# Patient Record
Sex: FEMALE | Race: WHITE | NOT HISPANIC OR LATINO
[De-identification: names, ages, dates, MRNs, and addresses within clinical notes are randomized per-mention and may not be internally consistent; named-entity substitution may affect disease eponyms.]

---

## 2020-10-01 ENCOUNTER — APPOINTMENT (OUTPATIENT)
Dept: INTERNAL MEDICINE | Facility: CLINIC | Age: 70
End: 2020-10-01
Payer: MEDICARE

## 2020-10-01 ENCOUNTER — NON-APPOINTMENT (OUTPATIENT)
Age: 70
End: 2020-10-01

## 2020-10-01 VITALS
HEIGHT: 61 IN | DIASTOLIC BLOOD PRESSURE: 67 MMHG | BODY MASS INDEX: 25.86 KG/M2 | SYSTOLIC BLOOD PRESSURE: 136 MMHG | RESPIRATION RATE: 12 BRPM | OXYGEN SATURATION: 99 % | WEIGHT: 137 LBS | HEART RATE: 70 BPM

## 2020-10-01 VITALS — DIASTOLIC BLOOD PRESSURE: 60 MMHG | SYSTOLIC BLOOD PRESSURE: 120 MMHG

## 2020-10-01 DIAGNOSIS — Z78.9 OTHER SPECIFIED HEALTH STATUS: ICD-10-CM

## 2020-10-01 PROCEDURE — 99497 ADVNCD CARE PLAN 30 MIN: CPT | Mod: 33

## 2020-10-01 PROCEDURE — 36415 COLL VENOUS BLD VENIPUNCTURE: CPT

## 2020-10-01 PROCEDURE — 93000 ELECTROCARDIOGRAM COMPLETE: CPT | Mod: 59

## 2020-10-01 PROCEDURE — G0439: CPT

## 2020-10-01 PROCEDURE — G0444 DEPRESSION SCREEN ANNUAL: CPT | Mod: 59

## 2020-10-02 ENCOUNTER — LABORATORY RESULT (OUTPATIENT)
Age: 70
End: 2020-10-02

## 2020-10-05 LAB
ALBUMIN SERPL ELPH-MCNC: 4.5 G/DL
ALP BLD-CCNC: 42 U/L
ALT SERPL-CCNC: 18 U/L
ANION GAP SERPL CALC-SCNC: 12 MMOL/L
APPEARANCE: CLEAR
AST SERPL-CCNC: 22 U/L
BACTERIA: NEGATIVE
BASOPHILS # BLD AUTO: 0.05 K/UL
BASOPHILS NFR BLD AUTO: 1.1 %
BILIRUB SERPL-MCNC: 0.8 MG/DL
BILIRUBIN URINE: NEGATIVE
BLOOD URINE: NEGATIVE
BUN SERPL-MCNC: 14 MG/DL
CALCIUM SERPL-MCNC: 9.3 MG/DL
CHLORIDE SERPL-SCNC: 104 MMOL/L
CHOLEST SERPL-MCNC: 240 MG/DL
CHOLEST/HDLC SERPL: 3.5 RATIO
CO2 SERPL-SCNC: 25 MMOL/L
COLOR: NORMAL
CREAT SERPL-MCNC: 0.73 MG/DL
EOSINOPHIL # BLD AUTO: 0.13 K/UL
EOSINOPHIL NFR BLD AUTO: 2.8 %
ESTIMATED AVERAGE GLUCOSE: 82 MG/DL
GLUCOSE QUALITATIVE U: NEGATIVE
GLUCOSE SERPL-MCNC: 89 MG/DL
HBA1C MFR BLD HPLC: 4.5 %
HCT VFR BLD CALC: 39.3 %
HDLC SERPL-MCNC: 69 MG/DL
HGB BLD-MCNC: 12.8 G/DL
HYALINE CASTS: 0 /LPF
IMM GRANULOCYTES NFR BLD AUTO: 0.4 %
KETONES URINE: NEGATIVE
LDLC SERPL CALC-MCNC: 160 MG/DL
LEUKOCYTE ESTERASE URINE: NEGATIVE
LYMPHOCYTES # BLD AUTO: 1.33 K/UL
LYMPHOCYTES NFR BLD AUTO: 28.2 %
MAN DIFF?: NORMAL
MCHC RBC-ENTMCNC: 32.6 GM/DL
MCHC RBC-ENTMCNC: 32.8 PG
MCV RBC AUTO: 100.8 FL
MICROSCOPIC-UA: NORMAL
MONOCYTES # BLD AUTO: 0.43 K/UL
MONOCYTES NFR BLD AUTO: 9.1 %
NEUTROPHILS # BLD AUTO: 2.76 K/UL
NEUTROPHILS NFR BLD AUTO: 58.4 %
NITRITE URINE: NEGATIVE
PH URINE: 7
PLATELET # BLD AUTO: 206 K/UL
POTASSIUM SERPL-SCNC: 4.8 MMOL/L
PROT SERPL-MCNC: 6.7 G/DL
PROTEIN URINE: NEGATIVE
RBC # BLD: 3.9 M/UL
RBC # FLD: 11.2 %
RED BLOOD CELLS URINE: 0 /HPF
SODIUM SERPL-SCNC: 141 MMOL/L
SPECIFIC GRAVITY URINE: 1.01
SQUAMOUS EPITHELIAL CELLS: 1 /HPF
TRIGL SERPL-MCNC: 58 MG/DL
UROBILINOGEN URINE: NORMAL
WBC # FLD AUTO: 4.72 K/UL
WHITE BLOOD CELLS URINE: 0 /HPF

## 2020-10-10 ENCOUNTER — APPOINTMENT (OUTPATIENT)
Dept: INTERNAL MEDICINE | Facility: CLINIC | Age: 70
End: 2020-10-10
Payer: MEDICARE

## 2020-10-10 VITALS
WEIGHT: 137 LBS | RESPIRATION RATE: 12 BRPM | BODY MASS INDEX: 25.86 KG/M2 | SYSTOLIC BLOOD PRESSURE: 145 MMHG | HEART RATE: 56 BPM | DIASTOLIC BLOOD PRESSURE: 73 MMHG | OXYGEN SATURATION: 99 % | HEIGHT: 61 IN

## 2020-10-10 PROCEDURE — 90674 CCIIV4 VAC NO PRSV 0.5 ML IM: CPT

## 2020-10-10 PROCEDURE — 99212 OFFICE O/P EST SF 10 MIN: CPT | Mod: 25

## 2020-10-10 PROCEDURE — G0008: CPT

## 2020-10-10 NOTE — PHYSICAL EXAM
[No Acute Distress] : no acute distress [PERRL] : pupils equal round and reactive to light [EOMI] : extraocular movements intact [No Lymphadenopathy] : no lymphadenopathy [Thyroid Normal, No Nodules] : the thyroid was normal and there were no nodules present [No Carotid Bruits] : no carotid bruits [No Abdominal Bruit] : a ~M bruit was not heard ~T in the abdomen [No Edema] : there was no peripheral edema [Normal Appearance] : normal in appearance [No Masses] : no palpable masses [No Nipple Discharge] : no nipple discharge [Normal] : no posterior cervical lymphadenopathy and no anterior cervical lymphadenopathy [No CVA Tenderness] : no CVA  tenderness [No Focal Deficits] : no focal deficits [de-identified] : NO SUSPICIOUS SKIN LESIONS

## 2020-10-10 NOTE — HEALTH RISK ASSESSMENT
[Very Good] : ~his/her~ current health as very good [0] : 2) Feeling down, depressed, or hopeless: Not at all (0) [Patient reported mammogram was normal] : Patient reported mammogram was normal [Patient reported PAP Smear was normal] : Patient reported PAP Smear was normal [Patient reported bone density results were abnormal] : Patient reported bone density results were abnormal [Patient reported colonoscopy was normal] : Patient reported colonoscopy was normal [No falls in past year] : Patient reported no falls in the past year [[id number]] : 0 [MammogramDate] : 10/209 [PapSmearDate] : 2018 [PapSmearComments] : SCHEDULED  [BoneDensityComments] : SEVRAL YEAR AGO , OSTEOPOROSIS TREATED  WITH RECLAST   [ColonoscopyDate] : 2018 [AdvancecareDate] : 10/1/2020 [FreeTextEntry4] : DISCUSSED W/ PATIENT , FORMS GIVEN , QUESTIONS ANSWERED

## 2020-10-10 NOTE — REVIEW OF SYSTEMS
[Negative] : Heme/Lymph [Earache] : no earache [Nasal Discharge] : no nasal discharge [Dysuria] : no dysuria [Hematuria] : no hematuria [Headache] : no headache [Dizziness] : no dizziness [FreeTextEntry3] : GLASSES

## 2020-10-10 NOTE — HISTORY OF PRESENT ILLNESS
[FreeTextEntry1] : ROUTINE CLINIC FOLLOW UP  [de-identified] : PATIENT HERE FOR FLU VACCINE , FEELS  WELL , NO FEVER

## 2020-10-12 ENCOUNTER — MED ADMIN CHARGE (OUTPATIENT)
Age: 70
End: 2020-10-12

## 2021-06-09 ENCOUNTER — NON-APPOINTMENT (OUTPATIENT)
Age: 71
End: 2021-06-09

## 2021-06-09 ENCOUNTER — APPOINTMENT (OUTPATIENT)
Dept: INTERNAL MEDICINE | Facility: CLINIC | Age: 71
End: 2021-06-09
Payer: MEDICARE

## 2021-06-09 VITALS
TEMPERATURE: 100.5 F | DIASTOLIC BLOOD PRESSURE: 59 MMHG | WEIGHT: 139 LBS | OXYGEN SATURATION: 95 % | HEART RATE: 85 BPM | BODY MASS INDEX: 26.26 KG/M2 | SYSTOLIC BLOOD PRESSURE: 93 MMHG

## 2021-06-09 VITALS — SYSTOLIC BLOOD PRESSURE: 98 MMHG | DIASTOLIC BLOOD PRESSURE: 60 MMHG

## 2021-06-09 LAB
FLUAV SPEC QL CULT: NORMAL
FLUBV AG SPEC QL IA: NORMAL

## 2021-06-09 PROCEDURE — 99214 OFFICE O/P EST MOD 30 MIN: CPT | Mod: 25

## 2021-06-09 PROCEDURE — 87804 INFLUENZA ASSAY W/OPTIC: CPT | Mod: QW

## 2021-06-09 NOTE — HISTORY OF PRESENT ILLNESS
[FreeTextEntry1] : C/O FEVER , SLIGHT COUGH  [de-identified] : PATIENT C/O FEVER  ON MONDAY W/ CHILLS , WENT TO LOCAL  AND HAD NEG COVID SWAB , SHE HAS BEEN VACCINATED FOR COVID COMPLETING SERIES IN MARCH . FEVER HAS PERSISTED UNTIL LAST NIGHT AND TOOK TYLENOL W/ RELIEF , ONE DAY AGO SHE DEVELOPED NON PRODUCTIVE COUGH , DENIES ANY DYSPNEA , CHEST PAINS , DIARRHEA , RECENT RAVEL , NO FAMILY MEMBERS ARE ILL

## 2021-06-09 NOTE — PHYSICAL EXAM
[No Acute Distress] : no acute distress [Normal Sclera/Conjunctiva] : normal sclera/conjunctiva [PERRL] : pupils equal round and reactive to light [EOMI] : extraocular movements intact [Normal Outer Ear/Nose] : the outer ears and nose were normal in appearance [Normal Oropharynx] : the oropharynx was normal [No Lymphadenopathy] : no lymphadenopathy [No Accessory Muscle Use] : no accessory muscle use [Normal Percussion] : the chest was normal to percussion [No Edema] : there was no peripheral edema [Normal] : soft, non-tender, non-distended, no masses palpated, no HSM and normal bowel sounds [Normal Supraclavicular Nodes] : no supraclavicular lymphadenopathy [Normal Posterior Cervical Nodes] : no posterior cervical lymphadenopathy [Normal Anterior Cervical Nodes] : no anterior cervical lymphadenopathy [No CVA Tenderness] : no CVA  tenderness [No Focal Deficits] : no focal deficits [de-identified] : + RALES RIGHT BASE , NO DULLNESS

## 2021-06-09 NOTE — REVIEW OF SYSTEMS
[Fever] : fever [Chills] : chills [Cough] : cough [Negative] : Musculoskeletal [Recent Change In Weight] : ~T no recent weight change [Headache] : no headache [Dizziness] : no dizziness [Unsteady Walking] : no ataxia

## 2021-06-10 LAB
ALBUMIN SERPL ELPH-MCNC: 4 G/DL
ALP BLD-CCNC: 44 U/L
ALT SERPL-CCNC: 27 U/L
ANION GAP SERPL CALC-SCNC: 11 MMOL/L
AST SERPL-CCNC: 32 U/L
BASOPHILS # BLD AUTO: 0.02 K/UL
BASOPHILS NFR BLD AUTO: 0.2 %
BILIRUB SERPL-MCNC: 1 MG/DL
BUN SERPL-MCNC: 13 MG/DL
CALCIUM SERPL-MCNC: 8.4 MG/DL
CHLORIDE SERPL-SCNC: 98 MMOL/L
CO2 SERPL-SCNC: 24 MMOL/L
CREAT SERPL-MCNC: 0.79 MG/DL
EOSINOPHIL # BLD AUTO: 0.01 K/UL
EOSINOPHIL NFR BLD AUTO: 0.1 %
GLUCOSE SERPL-MCNC: 153 MG/DL
HCT VFR BLD CALC: 36.9 %
HGB BLD-MCNC: 11.9 G/DL
IMM GRANULOCYTES NFR BLD AUTO: 0.6 %
LYMPHOCYTES # BLD AUTO: 0.59 K/UL
LYMPHOCYTES NFR BLD AUTO: 6.9 %
MAN DIFF?: NORMAL
MCHC RBC-ENTMCNC: 32 PG
MCHC RBC-ENTMCNC: 32.2 GM/DL
MCV RBC AUTO: 99.2 FL
MONOCYTES # BLD AUTO: 0.48 K/UL
MONOCYTES NFR BLD AUTO: 5.6 %
NEUTROPHILS # BLD AUTO: 7.37 K/UL
NEUTROPHILS NFR BLD AUTO: 86.6 %
PLATELET # BLD AUTO: 162 K/UL
POTASSIUM SERPL-SCNC: 3.7 MMOL/L
PROT SERPL-MCNC: 6.3 G/DL
RBC # BLD: 3.72 M/UL
RBC # FLD: 11.2 %
SARS-COV-2 N GENE NPH QL NAA+PROBE: NOT DETECTED
SODIUM SERPL-SCNC: 133 MMOL/L
WBC # FLD AUTO: 8.52 K/UL

## 2021-06-11 ENCOUNTER — APPOINTMENT (OUTPATIENT)
Dept: INTERNAL MEDICINE | Facility: CLINIC | Age: 71
End: 2021-06-11
Payer: MEDICARE

## 2021-06-11 ENCOUNTER — INPATIENT (INPATIENT)
Facility: HOSPITAL | Age: 71
LOS: 1 days | Discharge: HOME | DRG: 195 | End: 2021-06-13
Attending: INTERNAL MEDICINE | Admitting: INTERNAL MEDICINE
Payer: MEDICARE

## 2021-06-11 VITALS
WEIGHT: 138.89 LBS | OXYGEN SATURATION: 97 % | HEART RATE: 74 BPM | SYSTOLIC BLOOD PRESSURE: 118 MMHG | DIASTOLIC BLOOD PRESSURE: 74 MMHG | RESPIRATION RATE: 16 BRPM | TEMPERATURE: 97 F

## 2021-06-11 VITALS
TEMPERATURE: 98.5 F | SYSTOLIC BLOOD PRESSURE: 108 MMHG | WEIGHT: 139 LBS | DIASTOLIC BLOOD PRESSURE: 69 MMHG | BODY MASS INDEX: 26.24 KG/M2 | RESPIRATION RATE: 12 BRPM | HEIGHT: 61 IN | OXYGEN SATURATION: 96 % | HEART RATE: 90 BPM

## 2021-06-11 DIAGNOSIS — Z29.9 ENCOUNTER FOR PROPHYLACTIC MEASURES, UNSPECIFIED: ICD-10-CM

## 2021-06-11 DIAGNOSIS — E87.6 HYPOKALEMIA: ICD-10-CM

## 2021-06-11 DIAGNOSIS — Z98.890 OTHER SPECIFIED POSTPROCEDURAL STATES: Chronic | ICD-10-CM

## 2021-06-11 DIAGNOSIS — E87.8 OTHER DISORDERS OF ELECTROLYTE AND FLUID BALANCE, NOT ELSEWHERE CLASSIFIED: ICD-10-CM

## 2021-06-11 DIAGNOSIS — J18.9 PNEUMONIA, UNSPECIFIED ORGANISM: ICD-10-CM

## 2021-06-11 LAB
ALBUMIN SERPL ELPH-MCNC: 3 G/DL — LOW (ref 3.3–5)
ALP SERPL-CCNC: 41 U/L — SIGNIFICANT CHANGE UP (ref 40–120)
ALT FLD-CCNC: 47 U/L — SIGNIFICANT CHANGE UP (ref 12–78)
ANION GAP SERPL CALC-SCNC: 5 MMOL/L — SIGNIFICANT CHANGE UP (ref 5–17)
APPEARANCE UR: CLEAR — SIGNIFICANT CHANGE UP
APTT BLD: 28.7 SEC — SIGNIFICANT CHANGE UP (ref 27.5–35.5)
AST SERPL-CCNC: 53 U/L — HIGH (ref 15–37)
BASOPHILS # BLD AUTO: 0.04 K/UL — SIGNIFICANT CHANGE UP (ref 0–0.2)
BASOPHILS NFR BLD AUTO: 0.6 % — SIGNIFICANT CHANGE UP (ref 0–2)
BILIRUB SERPL-MCNC: 0.7 MG/DL — SIGNIFICANT CHANGE UP (ref 0.2–1.2)
BILIRUB UR-MCNC: NEGATIVE — SIGNIFICANT CHANGE UP
BUN SERPL-MCNC: 14 MG/DL — SIGNIFICANT CHANGE UP (ref 7–23)
CALCIUM SERPL-MCNC: 7.9 MG/DL — LOW (ref 8.5–10.1)
CHLORIDE SERPL-SCNC: 105 MMOL/L — SIGNIFICANT CHANGE UP (ref 96–108)
CO2 SERPL-SCNC: 27 MMOL/L — SIGNIFICANT CHANGE UP (ref 22–31)
COLOR SPEC: SIGNIFICANT CHANGE UP
CREAT SERPL-MCNC: 0.73 MG/DL — SIGNIFICANT CHANGE UP (ref 0.5–1.3)
DIFF PNL FLD: ABNORMAL
EOSINOPHIL # BLD AUTO: 0.05 K/UL — SIGNIFICANT CHANGE UP (ref 0–0.5)
EOSINOPHIL NFR BLD AUTO: 0.8 % — SIGNIFICANT CHANGE UP (ref 0–6)
GLUCOSE SERPL-MCNC: 124 MG/DL — HIGH (ref 70–99)
GLUCOSE UR QL: NEGATIVE — SIGNIFICANT CHANGE UP
HCT VFR BLD CALC: 34.8 % — SIGNIFICANT CHANGE UP (ref 34.5–45)
HGB BLD-MCNC: 11.6 G/DL — SIGNIFICANT CHANGE UP (ref 11.5–15.5)
IMM GRANULOCYTES NFR BLD AUTO: 0.8 % — SIGNIFICANT CHANGE UP (ref 0–1.5)
INR BLD: 1.13 RATIO — SIGNIFICANT CHANGE UP (ref 0.88–1.16)
KETONES UR-MCNC: NEGATIVE — SIGNIFICANT CHANGE UP
LACTATE SERPL-SCNC: 1 MMOL/L — SIGNIFICANT CHANGE UP (ref 0.7–2)
LEUKOCYTE ESTERASE UR-ACNC: NEGATIVE — SIGNIFICANT CHANGE UP
LYMPHOCYTES # BLD AUTO: 1.06 K/UL — SIGNIFICANT CHANGE UP (ref 1–3.3)
LYMPHOCYTES # BLD AUTO: 17.1 % — SIGNIFICANT CHANGE UP (ref 13–44)
MCHC RBC-ENTMCNC: 31.1 PG — SIGNIFICANT CHANGE UP (ref 27–34)
MCHC RBC-ENTMCNC: 33.3 GM/DL — SIGNIFICANT CHANGE UP (ref 32–36)
MCV RBC AUTO: 93.3 FL — SIGNIFICANT CHANGE UP (ref 80–100)
MONOCYTES # BLD AUTO: 0.76 K/UL — SIGNIFICANT CHANGE UP (ref 0–0.9)
MONOCYTES NFR BLD AUTO: 12.3 % — SIGNIFICANT CHANGE UP (ref 2–14)
NEUTROPHILS # BLD AUTO: 4.24 K/UL — SIGNIFICANT CHANGE UP (ref 1.8–7.4)
NEUTROPHILS NFR BLD AUTO: 68.4 % — SIGNIFICANT CHANGE UP (ref 43–77)
NITRITE UR-MCNC: NEGATIVE — SIGNIFICANT CHANGE UP
NRBC # BLD: 0 /100 WBCS — SIGNIFICANT CHANGE UP (ref 0–0)
PH UR: 6 — SIGNIFICANT CHANGE UP (ref 5–8)
PLATELET # BLD AUTO: 182 K/UL — SIGNIFICANT CHANGE UP (ref 150–400)
POTASSIUM SERPL-MCNC: 3.4 MMOL/L — LOW (ref 3.5–5.3)
POTASSIUM SERPL-SCNC: 3.4 MMOL/L — LOW (ref 3.5–5.3)
PROT SERPL-MCNC: 6.5 G/DL — SIGNIFICANT CHANGE UP (ref 6–8.3)
PROT UR-MCNC: NEGATIVE — SIGNIFICANT CHANGE UP
PROTHROM AB SERPL-ACNC: 13.1 SEC — SIGNIFICANT CHANGE UP (ref 10.6–13.6)
RAPID RVP RESULT: SIGNIFICANT CHANGE UP
RBC # BLD: 3.73 M/UL — LOW (ref 3.8–5.2)
RBC # FLD: 11.5 % — SIGNIFICANT CHANGE UP (ref 10.3–14.5)
SARS-COV-2 RNA SPEC QL NAA+PROBE: SIGNIFICANT CHANGE UP
SODIUM SERPL-SCNC: 137 MMOL/L — SIGNIFICANT CHANGE UP (ref 135–145)
SP GR SPEC: 1 — LOW (ref 1.01–1.02)
UROBILINOGEN FLD QL: NEGATIVE — SIGNIFICANT CHANGE UP
WBC # BLD: 6.2 K/UL — SIGNIFICANT CHANGE UP (ref 3.8–10.5)
WBC # FLD AUTO: 6.2 K/UL — SIGNIFICANT CHANGE UP (ref 3.8–10.5)

## 2021-06-11 PROCEDURE — 71046 X-RAY EXAM CHEST 2 VIEWS: CPT | Mod: 26

## 2021-06-11 PROCEDURE — G1004: CPT

## 2021-06-11 PROCEDURE — 99285 EMERGENCY DEPT VISIT HI MDM: CPT | Mod: CS

## 2021-06-11 PROCEDURE — 99214 OFFICE O/P EST MOD 30 MIN: CPT

## 2021-06-11 PROCEDURE — 71250 CT THORAX DX C-: CPT | Mod: 26,MG

## 2021-06-11 PROCEDURE — 93010 ELECTROCARDIOGRAM REPORT: CPT

## 2021-06-11 RX ORDER — PIPERACILLIN AND TAZOBACTAM 4; .5 G/20ML; G/20ML
3.38 INJECTION, POWDER, LYOPHILIZED, FOR SOLUTION INTRAVENOUS EVERY 8 HOURS
Refills: 0 | Status: DISCONTINUED | OUTPATIENT
Start: 2021-06-11 | End: 2021-06-11

## 2021-06-11 RX ORDER — ACETAMINOPHEN 500 MG
650 TABLET ORAL EVERY 6 HOURS
Refills: 0 | Status: DISCONTINUED | OUTPATIENT
Start: 2021-06-11 | End: 2021-06-13

## 2021-06-11 RX ORDER — PIPERACILLIN AND TAZOBACTAM 4; .5 G/20ML; G/20ML
3.38 INJECTION, POWDER, LYOPHILIZED, FOR SOLUTION INTRAVENOUS ONCE
Refills: 0 | Status: COMPLETED | OUTPATIENT
Start: 2021-06-11 | End: 2021-06-11

## 2021-06-11 RX ORDER — POTASSIUM CHLORIDE 20 MEQ
40 PACKET (EA) ORAL ONCE
Refills: 0 | Status: COMPLETED | OUTPATIENT
Start: 2021-06-11 | End: 2021-06-11

## 2021-06-11 RX ORDER — ENOXAPARIN SODIUM 100 MG/ML
40 INJECTION SUBCUTANEOUS DAILY
Refills: 0 | Status: DISCONTINUED | OUTPATIENT
Start: 2021-06-11 | End: 2021-06-13

## 2021-06-11 RX ORDER — SODIUM CHLORIDE 9 MG/ML
1000 INJECTION INTRAMUSCULAR; INTRAVENOUS; SUBCUTANEOUS
Refills: 0 | Status: DISCONTINUED | OUTPATIENT
Start: 2021-06-11 | End: 2021-06-13

## 2021-06-11 RX ORDER — SODIUM CHLORIDE 9 MG/ML
1950 INJECTION INTRAMUSCULAR; INTRAVENOUS; SUBCUTANEOUS ONCE
Refills: 0 | Status: COMPLETED | OUTPATIENT
Start: 2021-06-11 | End: 2021-06-11

## 2021-06-11 RX ORDER — PIPERACILLIN AND TAZOBACTAM 4; .5 G/20ML; G/20ML
3.38 INJECTION, POWDER, LYOPHILIZED, FOR SOLUTION INTRAVENOUS EVERY 8 HOURS
Refills: 0 | Status: DISCONTINUED | OUTPATIENT
Start: 2021-06-11 | End: 2021-06-12

## 2021-06-11 RX ORDER — AZITHROMYCIN 500 MG/1
500 TABLET, FILM COATED ORAL DAILY
Refills: 0 | Status: DISCONTINUED | OUTPATIENT
Start: 2021-06-12 | End: 2021-06-13

## 2021-06-11 RX ORDER — AZITHROMYCIN 500 MG/1
500 TABLET, FILM COATED ORAL EVERY 24 HOURS
Refills: 0 | Status: DISCONTINUED | OUTPATIENT
Start: 2021-06-12 | End: 2021-06-11

## 2021-06-11 RX ORDER — AZITHROMYCIN 500 MG/1
500 TABLET, FILM COATED ORAL ONCE
Refills: 0 | Status: COMPLETED | OUTPATIENT
Start: 2021-06-11 | End: 2021-06-11

## 2021-06-11 RX ORDER — ALBUTEROL 90 UG/1
2 AEROSOL, METERED ORAL THREE TIMES A DAY
Refills: 0 | Status: DISCONTINUED | OUTPATIENT
Start: 2021-06-11 | End: 2021-06-13

## 2021-06-11 RX ORDER — SACCHAROMYCES BOULARDII 250 MG
250 POWDER IN PACKET (EA) ORAL
Refills: 0 | Status: DISCONTINUED | OUTPATIENT
Start: 2021-06-11 | End: 2021-06-13

## 2021-06-11 RX ADMIN — Medication 40 MILLIEQUIVALENT(S): at 15:26

## 2021-06-11 RX ADMIN — Medication 250 MILLIGRAM(S): at 21:48

## 2021-06-11 RX ADMIN — SODIUM CHLORIDE 1950 MILLILITER(S): 9 INJECTION INTRAMUSCULAR; INTRAVENOUS; SUBCUTANEOUS at 12:38

## 2021-06-11 RX ADMIN — AZITHROMYCIN 255 MILLIGRAM(S): 500 TABLET, FILM COATED ORAL at 14:44

## 2021-06-11 RX ADMIN — PIPERACILLIN AND TAZOBACTAM 200 GRAM(S): 4; .5 INJECTION, POWDER, LYOPHILIZED, FOR SOLUTION INTRAVENOUS at 14:00

## 2021-06-11 RX ADMIN — AZITHROMYCIN 500 MILLIGRAM(S): 500 TABLET, FILM COATED ORAL at 15:45

## 2021-06-11 RX ADMIN — SODIUM CHLORIDE 1950 MILLILITER(S): 9 INJECTION INTRAMUSCULAR; INTRAVENOUS; SUBCUTANEOUS at 13:00

## 2021-06-11 RX ADMIN — Medication 650 MILLIGRAM(S): at 20:26

## 2021-06-11 RX ADMIN — PIPERACILLIN AND TAZOBACTAM 25 GRAM(S): 4; .5 INJECTION, POWDER, LYOPHILIZED, FOR SOLUTION INTRAVENOUS at 21:48

## 2021-06-11 RX ADMIN — ALBUTEROL 2 PUFF(S): 90 AEROSOL, METERED ORAL at 19:26

## 2021-06-11 RX ADMIN — SODIUM CHLORIDE 50 MILLILITER(S): 9 INJECTION INTRAMUSCULAR; INTRAVENOUS; SUBCUTANEOUS at 19:25

## 2021-06-11 RX ADMIN — PIPERACILLIN AND TAZOBACTAM 3.38 GRAM(S): 4; .5 INJECTION, POWDER, LYOPHILIZED, FOR SOLUTION INTRAVENOUS at 14:30

## 2021-06-11 RX ADMIN — Medication 650 MILLIGRAM(S): at 19:26

## 2021-06-11 NOTE — ED PROVIDER NOTE - PROGRESS NOTE DETAILS
Patient specifically requesting admission to Dr. JOLANTA Marcial as per pmd request. Will call Dr. Marcial and see if she accepts. Patient has never been admitted to  before. Case discussed with Dr. JOLANTA Marcial, accepts admission, aware patient is Dr. Duron's patient. Requests CT chest and admit.

## 2021-06-11 NOTE — ED PROVIDER NOTE - OBJECTIVE STATEMENT
72 yo female with no significant pmh sent to ED by her pmd for pneumonia. Patient started feeling sick last weekend, then Monday started having a fever. Patient went to  and was told it watch her symptoms, most likely viral. Patient then went to pmd on Wednesday, diagnosed with pneumonia and started on zpak and cefuroxime. Patient still having fever, last temp this morning 7 am, 103, took 1 g of tylenol. Patient had follow up with pmd this morning who recommended coming to ED for further evaluation. Patient had outpatient labs and covid swab 2 days ago which were unremarkable. PAtient c/o cough, generalized weakness, diarrhea and decreased appetite however patient is tolerating PO. Denies fever, chills, chest pain, sob, abd pain, N/V, urinary sxs, flank pain.     pmd: Dr. Willis

## 2021-06-11 NOTE — CONSULT NOTE ADULT - SUBJECTIVE AND OBJECTIVE BOX
[facility] DIVISION of INFECTIOUS DISEASE  Reji Szymanski MD PhD, Cyndie Patel MD, Farheen Marcial MD, Nathaniel Tesfaye MD  and providing coverage with Pratibha Enriquez MD and Audelia Peters MD  Providing Infectious Disease Consultations at [facility], [facility], [facility]'s    Office# [phone] to schedule follow up appointments  Answering Service for urgent calls or New Consults [phone]  Cell# to text for urgent issues Reji Szymanski [phone]     HPI:  70yo F, with no significant PMH, presenting from home with complaint of cough and fever. Patient felt ill on Saturday, stating she had a headache. She awoke feeling well again on , however by Monday, she had developed a fever of 101 deg. She initially presented to an urgent care center that day and was told her symptoms were likely viral in nature. Symptoms persisted, and she visited her PCP Dr. Duron on Wednesday, at which time patient was diagnosed with pneumonia by CXR and was prescribed azithromycin and Ceftin. Fever has continued with Tmax 103 deg this AM, which has responded to Tylenol, last taken at 7:30 AM. Currently, patient endorses nonproductive cough. She notes she had liquidy diarrhea for the past few days but has not had loose BMs today at all. She also states her appetite has decreased over the last few days but at time of my encounter, she feels hungry. Otherwise denies vision/hearing changes, chest pain, palpitations, dyspnea, abd pain, N/V, urinary symptoms, skin changes, or new myalgia/arthralgia.    In the ED  VS: T 97 HR 74 /74 RR 16 SpO2 97% on room air  Significant labs include K 3.4, Ca 7.9 (corrected 8.7), alb 3.0, AST 53.  CXR: infiltrate of RLL superior segment  CT chest: RLL PNA  EKG: NSR with no concerning ST segment/Tw abnormalities    Patient received 1950cc NS, 1x Zosyn, and 1x azithromycin. (2021 14:46)      PAST MEDICAL & SURGICAL HISTORY:  No pertinent past medical history    S/P excision of lipoma        Antimicrobials  azithromycin  IVPB 500 milliGRAM(s) IV Intermittent every 24 hours  piperacillin/tazobactam IVPB.. 3.375 Gram(s) IV Intermittent every 8 hours      Immunological      Other  acetaminophen   Tablet .. 650 milliGRAM(s) Oral every 6 hours PRN  ALBUTerol    90 MICROgram(s) HFA Inhaler 2 Puff(s) Inhalation three times a day  benzonatate 200 milliGRAM(s) Oral three times a day PRN  enoxaparin Injectable 40 milliGRAM(s) SubCutaneous daily  guaiFENesin Oral Liquid (Sugar-Free) 200 milliGRAM(s) Oral every 6 hours PRN  saccharomyces boulardii 250 milliGRAM(s) Oral two times a day  sodium chloride 0.9%. 1000 milliLiter(s) IV Continuous <Continuous>      Allergies    No Known Allergies    Intolerances        SOCIAL HISTORY:  Marital Status:  (   )    (   ) Single    (   )    ( x )   Occupation:   Lives with: ( x ) alone  (  ) children   (  ) spouse   (  ) parents  (  ) other    Substance Use (street drugs): ( x ) never used  (  ) other:  Tobacco Usage:  (  x ) never smoked   (   ) former smoker   (   ) current smoker  (     ) pack year  (        ) last cigarette date  Alcohol Usage: never    Immunization Hx:     (  ) pneumonia shot               (     ) date  (  ) COVID, Moderna /                           ( 3/2021 ) date (2021 14:46)      FAMILY HISTORY:  No pertinent family history in first degree relatives        ROS:    EYES:  Negative  blurry vision or double vision  GASTROINTESTINAL:  Negative for nausea, vomiting, diarrhea  -otherwise negative except for subjective    Vital Signs Last 24 Hrs  T(C): 37.4 (2021 15:30), Max: 37.4 (2021 15:30)  T(F): 99.3 (2021 15:30), Max: 99.3 (2021 15:30)  HR: 72 (2021 16:35) (62 - 74)  BP: 141/76 (2021 16:35) (118/74 - 146/69)  BP(mean): --  RR: 18 (2021 16:35) (16 - 18)  SpO2: 97% (2021 16:35) (97% - 97%)    PE:  WDWN in no distress  HEENT:  NC, PERRL, sclerae anicteric, conjunctivae clear, EOMI.  Sinuses nontender, no nasal exudate.  No buccal or pharyngeal lesions, erythema or exudate  Neck:  Supple, no adenopathy  Lungs:  No accessory muscle use, noted asymmetry in bases  Cor:  RRR, S1, S2, no murmur appreciated  Abd:  Symmetric, normoactive BS.  Soft, nontender, no masses, guarding or rebound.  Liver and spleen not enlarged  Extrem:  No cyanosis or edema  Skin:  No rashes.  Neuro: grossly intact  Musc: moving all limbs freely, no focal deficits        LABS:                        11.6   6.20  )-----------( 182      ( 2021 12:41 )             34.8       WBC Count: 6.20 K/uL (21 @ 12:41)          137  |  105  |  14  ----------------------------<  124<H>  3.4<L>   |  27  |  0.73    Ca    7.9<L>      2021 12:41    TPro  6.5  /  Alb  3.0<L>  /  TBili  0.7  /  DBili  x   /  AST  53<H>  /  ALT  47  /  AlkPhos  41        Creatinine, Serum: 0.73 mg/dL (21 @ 12:41)      Urinalysis Basic - ( 2021 16:58 )    Color: Pale Yellow / Appearance: Clear / S.005 / pH: x  Gluc: x / Ketone: Negative  / Bili: Negative / Urobili: Negative   Blood: x / Protein: Negative / Nitrite: Negative   Leuk Esterase: Negative / RBC: 0-2 /HPF / WBC Negative   Sq Epi: x / Non Sq Epi: Occasional / Bacteria: Occasional      MICROBIOLOGY:      RADIOLOGY & ADDITIONAL STUDIES:    --< from: CT Chest No Cont (21 @ 14:36) >    EXAM:  CT CHEST                            PROCEDURE DATE:  2021          INTERPRETATION:  CLINICAL INFORMATION: 71 years  Female with Pneumonia, unresolved.    COMPARISON: None.    CONTRAST/COMPLICATIONS:  IV Contrast: NONE  0 cc administered   0 cc discarded  Oral Contrast: NONE  Complications: None reported at time of study completion    PROCEDURE:  CT of the Chest was performed.  Sagittal and coronal reformats were performed.    FINDINGS:    LUNGS AND AIRWAYS: Patent central airways.Right lower lobe airspace consolidation. The remaining lung fields are clear.  PLEURA: No pleural effusion.  MEDIASTINUM AND CUATE: No lymphadenopathy.  VESSELS: Within normal limits.  HEART: Heart size is normal. No pericardial effusion.  CHEST WALL AND LOWER NECK: Within normal limits.  VISUALIZED UPPER ABDOMEN: 2.9 cm right upper pole renal cyst.  BONES: Within normal limits.    IMPRESSION:  Right lower lobe pneumonia

## 2021-06-11 NOTE — ED PROVIDER NOTE - BREATH SOUNDS
+ rales left base. no respiratory distress, talking in full sentences. no wheezing. + rales right base. no respiratory distress, talking in full sentences. no wheezing.

## 2021-06-11 NOTE — H&P ADULT - PROBLEM SELECTOR PLAN 3
- Pharm VTE ppx with daily Lovenox    IMPROVE VTE Individual Risk Assessment          RISK                                                          Points  [  ] Previous VTE                                                3  [  ] Thrombophilia                                             2  [  ] Lower limb paralysis                                   2        (unable to hold up >15 seconds)    [  ] Current Cancer                                             2         (within 6 months)  [  ] Immobilization > 24 hrs                              1  [  ] ICU/CCU stay > 24 hours                             1  [ x ] Age > 60                                                         1    IMPROVE VTE Score: 1

## 2021-06-11 NOTE — H&P ADULT - PROBLEM SELECTOR PLAN 1
- Patient with fever and cough x5d, prescribed azithromycin and Ceftin however persistently symptomatic  - Admit to F  - CXR reveals infiltrate in the superior segment of the right lower lobe with smaller infiltrate inferiorly  - CT chest noncon ordered, f/u results  - S/p 1950cc NS, azithromycin, and Zosyn in the ED, continue with Zosyn and azithromycin for atypical coverage  - Patient not currently meeting sepsis critieria  - Currently afebrile as patient took Tylenol at home, continue with Tylenol 650mg PO q6h PRN for fever  - Blood and urine cultures obtained in the ED, f/u results  - F/u MRSA PCR, legionella urine Ag, and strep urine Ag  - Leukocytosis absent at this time, monitor daily CBC - Patient with fever and cough x5d, prescribed azithromycin and Ceftin however persistently symptomatic  - Admit to F  - CXR reveals infiltrate in the superior segment of the right lower lobe with smaller infiltrate inferiorly  - CT chest noncon shows RLL PNA  - S/p 1950cc NS, azithromycin, and Zosyn in the ED, continue with Zosyn and azithromycin for atypical coverage  - Patient not currently meeting sepsis criteria but will continue with gentle maintenance IVF as patient with diminished appetitie  - Currently afebrile as patient took Tylenol at home, continue with Tylenol 650mg PO q6h PRN for fever  - Albuterol HFA TID and guaifenesin PRN  - Blood and urine cultures obtained in the ED, f/u results  - F/u MRSA PCR, legionella urine Ag, and strep urine Ag  - Leukocytosis absent at this time, monitor daily CBC - Patient with fever and cough x5d, prescribed azithromycin and Ceftin however persistently symptomatic with Fever & Cough   - Admit to F  - CXR reveals infiltrate in the superior segment of the right lower lobe with smaller infiltrate inferiorly  - CT chest noncon shows RLL PNA  - S/p 1950cc NS, azithromycin, and Zosyn in the ED, continue with Zosyn and azithromycin for atypical coverage  - Patient not currently meeting sepsis criteria but will continue with gentle maintenance IVF as patient with diminished appetitie  - Currently afebrile as patient took Tylenol at home, continue with Tylenol 650mg PO q6h PRN for fever  - Albuterol HFA TID and guaifenesin PRN  - Blood and urine cultures obtained in the ED, f/u results  - F/u MRSA PCR, legionella urine Ag, and strep urine Ag  - Leukocytosis absent at this time, monitor daily CBC  -ID Dr Szymanski

## 2021-06-11 NOTE — H&P ADULT - NSHPSOCIALHISTORY_GEN_ALL_CORE
Social History:    Marital Status:  (   )    (   ) Single    (   )    (  )   Occupation:   Lives with: (  ) alone  (  ) children   (  ) spouse   (  ) parents  (  ) other    Substance Use (street drugs): (  ) never used  (  ) other:  Tobacco Usage:  (   ) never smoked   (   ) former smoker   (   ) current smoker  (     ) pack year  (        ) last cigarette date  Alcohol Usage:  Sexual History:     Health Management     For female:   Last Mammo: (     ) No  (    ) Yes  (    ) Normal  (    ) Date   Last Pap: (     ) No  (    ) Yes  (    ) Normal   (    ) Date     For male:  Last prostate exam: (     ) No  (    ) Yes  (    ) Date  (    ) Normal    Immunization Hx:   (  ) flu shot                               (     ) date   (  ) pneumonia shot               (     ) date  (  ) tetanus                               (     ) date     (     ) Advanced Directives: (     ) None    (      ) DNR    (     ) DNI    (     ) Health Care Proxy: Social History:    Marital Status:  (   )    (   ) Single    (   )    ( x )   Occupation:   Lives with: ( x ) alone  (  ) children   (  ) spouse   (  ) parents  (  ) other    Substance Use (street drugs): ( x ) never used  (  ) other:  Tobacco Usage:  (  x ) never smoked   (   ) former smoker   (   ) current smoker  (     ) pack year  (        ) last cigarette date  Alcohol Usage: never    Immunization Hx:     (  ) pneumonia shot               (     ) date  (  ) COVID, Moderna 2/2                           ( 3/2021 ) date

## 2021-06-11 NOTE — H&P ADULT - ATTENDING COMMENTS
72yo F, with no significant PMH, presenting from home with complaint of cough and fever, admitted for CAP that has failed outpatient abx.  Pt seen, examined, case & care plan d/w pt, residents at detail.  AM labs   PO diet   Ambulation.  ID Dr Szymanski   D/W Son at detail. 70yo F, with no significant PMH, presenting from home with complaint of cough and fever, admitted for RT LL CAP that has failed outpatient abx.  Pt seen, examined, case & care plan d/w pt, residents at detail.  AM labs   PO diet   Ambulation.  ID Dr Szymanski   D/W Son at detail.

## 2021-06-11 NOTE — ED PROVIDER NOTE - ATTENDING CONTRIBUTION TO CARE
pt is a 70 yo f who is prev healthy remote hx of pneumonia 8 years ago otherwise no illness or injury was feeling unwell earlier this week saw pmd with cough and fever found on xray to have pneumonia started on cefuroxime and Zithromax but today on re-evaluation found to have worsening exam and fevers  pt had neg covid test 2 days ago  on eval  plesant female nad no dyspnea  heent nc at [facility] perrla eomi anicteric no g f r op clear  neck supple  cor rrr  chest r sided crackles on mid to lower lung fields on exhalation  abd soft nt nt no hsm  ext normal  skin neuro normal  plan pt has pneumonia worsening despite abx sepsis workup ro viral pn with covid viral panel  xray iv abx and re evaluation

## 2021-06-11 NOTE — ED PROVIDER NOTE - CLINICAL SUMMARY MEDICAL DECISION MAKING FREE TEXT BOX
70 yo female with no significant pmh sent to ED by her pmd for pneumonia. Patient started feeling sick last weekend, then Monday started having a fever. Patient went to  and was told it watch her symptoms, most likely viral. Patient then went to pmd on Wednesday, diagnosed with pneumonia and started on zpak and cefuroxime. Patient still having fever, last temp this morning 7 am, 103, took 1 g of tylenol. Patient had follow up with pmd this morning who recommended coming to ED for further evaluation. Patient had outpatient labs and covid swab 2 days ago which were unremarkable. PAtient c/o cough, generalized weakness, diarrhea and decreased appetite however patient is tolerating PO. Denies fever, chills, chest pain, sob, abd pain, N/V, urinary sxs, flank pain. PE: as above. A/P: ekg, sepsis workup, cxr, fluids, reassess. 72 yo female with no significant pmh sent to ED by her pmd for pneumonia. Patient started feeling sick last weekend, then Monday started having a fever. Patient went to  and was told it watch her symptoms, most likely viral. Patient then went to pmd on Wednesday, diagnosed with pneumonia and started on zpak and cefuroxime. Patient still having fever, last temp this morning 7 am, 103, took 1 g of tylenol. Patient had follow up with pmd this morning who recommended coming to ED for further evaluation. Patient had outpatient labs and covid swab 2 days ago which were unremarkable. PAtient c/o cough, generalized weakness, diarrhea and decreased appetite however patient is tolerating PO. Denies fever, chills, chest pain, sob, abd pain, N/V, urinary sxs, flank pain. PE: as above. A/P: ekg, sepsis workup, cxr, fluids, reassess. + right sided pneumonia. Will admit.

## 2021-06-11 NOTE — CONSULT NOTE ADULT - ASSESSMENT
72yo F, with no significant PMH, presenting from home with complaint of cough, fever, and noted to have evidence of RLL airspace disease on exam and imaging    RECOMMENDATIONS    Story is compelling for Community Acquired PNA, RVP is negative and will send off additional testing to help guide therapy including MRSA nares PCR, urine for legionella and sputum, recommend standard CAP Rx with   Azithromycin/Ceftriaxone for now with recs to follow.    Thank you for consulting us and involving us in the management of this most interesting and challenging case.  We will follow along in the care of this patient. Please call us at [phone] or text me directly on my cell# at [phone] with any concerns.

## 2021-06-11 NOTE — H&P ADULT - RS GEN PE MLT RESP DETAILS PC
airway patent/good air movement/respirations non-labored/no rales/no wheezes airway patent/breath sounds equal/good air movement/respirations non-labored/no chest wall tenderness/no rales/no wheezes/diminished breath sounds, R/rhonchi

## 2021-06-11 NOTE — H&P ADULT - HISTORY OF PRESENT ILLNESS
72yo F, with no significant PMH, presenting from home with complaint of cough and fever. Patient felt ill last weekend and developed a fever on Monday. She initially presented to an urgent care center and was told her symptoms were likely viral in nature. Symptoms persisted, and she visited her PCP Dr. Duron, at which time patient was diagnosed with pneumonia and was prescribed azithromycin and Ceftin. Fever has continued with Tmax 103 deg this AM, which has responded to Tylenol, last taken at ___.     In the ED  VS: T 97 HR 74 /74 RR 16 SpO2 97% on room air  Significant labs include K 3.4, Ca 7.9 (corrected 8.7), alb 3.0, AST 53.  CXR: infiltrate of RLL superior segment  CT chest:  EKG:    Patient received 1950cc NS, 1x Zosyn, and 1x azithromycin. 70yo F, with no significant PMH, presenting from home with complaint of cough and fever. Patient felt ill on Saturday, stating she had a headache. She awoke feeling well again on Sunday, however by Monday, she had developed a fever of 101 deg. She initially presented to an urgent care center that day and was told her symptoms were likely viral in nature. Symptoms persisted, and she visited her PCP Dr. Duron on Wednesday, at which time patient was diagnosed with pneumonia by CXR and was prescribed azithromycin and Ceftin. Fever has continued with Tmax 103 deg this AM, which has responded to Tylenol, last taken at 7:30 AM. Currently, patient endorses nonproductive cough. She notes she had liquidy diarrhea for the past few days but has not had loose BMs today at all. She also states her appetite has decreased over the last few days but at time of my encounter, she feels hungry. Otherwise denies vision/hearing changes, chest pain, palpitations, dyspnea, abd pain, N/V, urinary symptoms, skin changes, or new myalgia/arthralgia.    In the ED  VS: T 97 HR 74 /74 RR 16 SpO2 97% on room air  Significant labs include K 3.4, Ca 7.9 (corrected 8.7), alb 3.0, AST 53.  CXR: infiltrate of RLL superior segment  CT chest: RLL PNA  EKG: NSR with no concerning ST segment/Tw abnormalities    Patient received 1950cc NS, 1x Zosyn, and 1x azithromycin.

## 2021-06-11 NOTE — ED ADULT NURSE NOTE - OBJECTIVE STATEMENT
received pt in bed #6b Pt A&O c/o not feeling well cough, weakness & dizziness x 1 week Seen by PMD on Wednesday had CXR dx w/ PN & placed on zpack & another antibx Pt continues to have fever took gram tylenol @ 0700 Seen by PMD today & was told her lungs sound worse & to go to ED. Pt's main complaints is weakness received pt in bed #6b Pt A&O c/o not feeling well cough, weakness & dizziness x 1 week Seen by PMD on Wednesday had CXR dx w/ PN & placed on zpack & another antibx Pt continues to have fever took gram tylenol @ 0700 Seen by PMD today & was told her lungs sound worse & to go to ED. Pt's main complaints is weakness. CM placed w/ SR o2 sat 97% on RA

## 2021-06-11 NOTE — H&P ADULT - ASSESSMENT
72yo F, with no significant PMH, presenting from home with complaint of cough and fever, admitted for CAP that has failed outpatient abx.

## 2021-06-12 ENCOUNTER — TRANSCRIPTION ENCOUNTER (OUTPATIENT)
Age: 71
End: 2021-06-12

## 2021-06-12 DIAGNOSIS — D64.9 ANEMIA, UNSPECIFIED: ICD-10-CM

## 2021-06-12 LAB
ALBUMIN SERPL ELPH-MCNC: 2.8 G/DL — LOW (ref 3.3–5)
ALP SERPL-CCNC: 37 U/L — LOW (ref 40–120)
ALT FLD-CCNC: 42 U/L — SIGNIFICANT CHANGE UP (ref 12–78)
ANION GAP SERPL CALC-SCNC: 7 MMOL/L — SIGNIFICANT CHANGE UP (ref 5–17)
AST SERPL-CCNC: 49 U/L — HIGH (ref 15–37)
BILIRUB SERPL-MCNC: 0.7 MG/DL — SIGNIFICANT CHANGE UP (ref 0.2–1.2)
BUN SERPL-MCNC: 7 MG/DL — SIGNIFICANT CHANGE UP (ref 7–23)
CALCIUM SERPL-MCNC: 7.5 MG/DL — LOW (ref 8.5–10.1)
CHLORIDE SERPL-SCNC: 109 MMOL/L — HIGH (ref 96–108)
CO2 SERPL-SCNC: 23 MMOL/L — SIGNIFICANT CHANGE UP (ref 22–31)
COVID-19 SPIKE DOMAIN AB INTERP: POSITIVE
COVID-19 SPIKE DOMAIN ANTIBODY RESULT: >250 U/ML — HIGH
CREAT SERPL-MCNC: 0.51 MG/DL — SIGNIFICANT CHANGE UP (ref 0.5–1.3)
GLUCOSE SERPL-MCNC: 111 MG/DL — HIGH (ref 70–99)
HCT VFR BLD CALC: 30.7 % — LOW (ref 34.5–45)
HCV AB S/CO SERPL IA: 0.08 S/CO — SIGNIFICANT CHANGE UP (ref 0–0.99)
HCV AB SERPL-IMP: SIGNIFICANT CHANGE UP
HGB BLD-MCNC: 10.5 G/DL — LOW (ref 11.5–15.5)
LEGIONELLA AG UR QL: NEGATIVE — SIGNIFICANT CHANGE UP
MCHC RBC-ENTMCNC: 31.6 PG — SIGNIFICANT CHANGE UP (ref 27–34)
MCHC RBC-ENTMCNC: 34.2 GM/DL — SIGNIFICANT CHANGE UP (ref 32–36)
MCV RBC AUTO: 92.5 FL — SIGNIFICANT CHANGE UP (ref 80–100)
NRBC # BLD: 0 /100 WBCS — SIGNIFICANT CHANGE UP (ref 0–0)
PLATELET # BLD AUTO: 180 K/UL — SIGNIFICANT CHANGE UP (ref 150–400)
POTASSIUM SERPL-MCNC: 3.9 MMOL/L — SIGNIFICANT CHANGE UP (ref 3.5–5.3)
POTASSIUM SERPL-SCNC: 3.9 MMOL/L — SIGNIFICANT CHANGE UP (ref 3.5–5.3)
PROT SERPL-MCNC: 5.8 G/DL — LOW (ref 6–8.3)
RBC # BLD: 3.32 M/UL — LOW (ref 3.8–5.2)
RBC # FLD: 11.7 % — SIGNIFICANT CHANGE UP (ref 10.3–14.5)
SARS-COV-2 IGG+IGM SERPL QL IA: >250 U/ML — HIGH
SARS-COV-2 IGG+IGM SERPL QL IA: POSITIVE
SODIUM SERPL-SCNC: 139 MMOL/L — SIGNIFICANT CHANGE UP (ref 135–145)
WBC # BLD: 4.91 K/UL — SIGNIFICANT CHANGE UP (ref 3.8–10.5)
WBC # FLD AUTO: 4.91 K/UL — SIGNIFICANT CHANGE UP (ref 3.8–10.5)

## 2021-06-12 RX ORDER — CEFTRIAXONE 500 MG/1
1000 INJECTION, POWDER, FOR SOLUTION INTRAMUSCULAR; INTRAVENOUS EVERY 24 HOURS
Refills: 0 | Status: DISCONTINUED | OUTPATIENT
Start: 2021-06-12 | End: 2021-06-13

## 2021-06-12 RX ADMIN — Medication 250 MILLIGRAM(S): at 05:27

## 2021-06-12 RX ADMIN — AZITHROMYCIN 500 MILLIGRAM(S): 500 TABLET, FILM COATED ORAL at 11:51

## 2021-06-12 RX ADMIN — ALBUTEROL 2 PUFF(S): 90 AEROSOL, METERED ORAL at 19:04

## 2021-06-12 RX ADMIN — PIPERACILLIN AND TAZOBACTAM 25 GRAM(S): 4; .5 INJECTION, POWDER, LYOPHILIZED, FOR SOLUTION INTRAVENOUS at 07:10

## 2021-06-12 RX ADMIN — Medication 250 MILLIGRAM(S): at 18:09

## 2021-06-12 RX ADMIN — ALBUTEROL 2 PUFF(S): 90 AEROSOL, METERED ORAL at 11:51

## 2021-06-12 RX ADMIN — ALBUTEROL 2 PUFF(S): 90 AEROSOL, METERED ORAL at 07:11

## 2021-06-12 RX ADMIN — Medication 200 MILLIGRAM(S): at 18:14

## 2021-06-12 RX ADMIN — Medication 200 MILLIGRAM(S): at 04:53

## 2021-06-12 RX ADMIN — CEFTRIAXONE 100 MILLIGRAM(S): 500 INJECTION, POWDER, FOR SOLUTION INTRAMUSCULAR; INTRAVENOUS at 14:39

## 2021-06-12 RX ADMIN — Medication 200 MILLIGRAM(S): at 15:48

## 2021-06-12 RX ADMIN — ENOXAPARIN SODIUM 40 MILLIGRAM(S): 100 INJECTION SUBCUTANEOUS at 11:51

## 2021-06-12 NOTE — PROGRESS NOTE ADULT - SUBJECTIVE AND OBJECTIVE BOX
[facility] DIVISION of INFECTIOUS DISEASE  Reji Szymanski MD PhD, Cyndie Patel MD, Farheen Marcial MD, Nathaniel Tesfaye MD  and providing coverage with Pratibha Enriquez MD and Audelia Peters MD  Providing Infectious Disease Consultations at [facility], [facility], [facility]'s    Office# [phone] to schedule follow up appointments  Answering Service for urgent calls or New Consults [phone]  Cell# to text for urgent issues Reji Szymanski [phone]     infectious diseases progress note:    LISA CHENEY is a 71y y. o. Female patient    Patient reports: feeling improved, some loose stools    ROS:    EYES:  Negative  blurry vision or double vision  GASTROINTESTINAL:  Negative for nausea, vomiting,  -otherwise negative except for subjective    Allergies    No Known Allergies    Intolerances        ANTIBIOTICS/RELEVANT:  antimicrobials  azithromycin   Tablet 500 milliGRAM(s) Oral daily  piperacillin/tazobactam IVPB.. 3.375 Gram(s) IV Intermittent every 8 hours    immunologic:    OTHER:  acetaminophen   Tablet .. 650 milliGRAM(s) Oral every 6 hours PRN  ALBUTerol    90 MICROgram(s) HFA Inhaler 2 Puff(s) Inhalation three times a day  benzonatate 200 milliGRAM(s) Oral three times a day PRN  enoxaparin Injectable 40 milliGRAM(s) SubCutaneous daily  guaiFENesin Oral Liquid (Sugar-Free) 200 milliGRAM(s) Oral every 6 hours PRN  saccharomyces boulardii 250 milliGRAM(s) Oral two times a day  sodium chloride 0.9%. 1000 milliLiter(s) IV Continuous <Continuous>      Objective:  Last 24-Vital Signs Last 24 Hrs  T(C): 37.1 (2021 05:33), Max: 38.1 (2021 19:20)  T(F): 98.7 (2021 05:33), Max: 100.6 (2021 19:20)  HR: 70 (2021 05:33) (62 - 82)  BP: 120/70 (2021 05:33) (118/74 - 146/69)  BP(mean): --  RR: 18 (2021 05:33) (16 - 18)  SpO2: 91% (2021 05:33) (91% - 97%)    T(C): 37.1 (21 @ :33), Max: 38.1 (21 @ 19:20)  T(F): 98.7 (21 @ :33), Max: 100.6 (21 @ 19:20)  T(C): 37.1 (21 @ :33), Max: 38.1 (21 @ 19:20)  T(F): 98.7 (21 @ :33), Max: 100.6 (21 @ 19:20)  T(C): 37.1 (21 @ :33), Max: 38.1 (21 @ 19:20)  T(F): 98.7 (21 @ :33), Max: 100.6 (21 @ 19:20)    PHYSICAL EXAM:  Constitutional: Well-developed, well nourished  Eyes: PERRLA, EOMI  Ear/Nose/Throat: oropharynx normal	  Neck: no JVD, no lymphadenopathy, supple  Respiratory: no accessory muscle use, crackles RLL  Cardiovascular: RRR, normal S1, S2 no m/r/g  Gastrointestinal: soft, NT, no HSM, BS-normal  Extremities: no clubbing, no cyanosis, edema absent  Neuro: patient alert, oriented and appropriate  Skin: no sig lesions        LABS:                        10.5   4.91  )-----------( 180      ( 2021 06:54 )             30.7       WBC 4.91  12 @ 06:54  WBC 6.20   @ 12:41      12    139  |  109<H>  |  7   ----------------------------<  111<H>  3.9   |  23  |  0.51    Ca    7.5<L>      2021 06:54    TPro  5.8<L>  /  Alb  2.8<L>  /  TBili  0.7  /  DBili  x   /  AST  49<H>  /  ALT  42  /  AlkPhos  37<L>  06-12      Creatinine, Serum: 0.51 mg/dL (21 @ 06:54)  Creatinine, Serum: 0.73 mg/dL (21 @ 12:41)      PT/INR - ( 2021 12:41 )   PT: 13.1 sec;   INR: 1.13 ratio         PTT - ( 2021 12:41 )  PTT:28.7 sec  Urinalysis Basic - ( 2021 16:58 )    Color: Pale Yellow / Appearance: Clear / S.005 / pH: x  Gluc: x / Ketone: Negative  / Bili: Negative / Urobili: Negative   Blood: x / Protein: Negative / Nitrite: Negative   Leuk Esterase: Negative / RBC: 0-2 /HPF / WBC Negative   Sq Epi: x / Non Sq Epi: Occasional / Bacteria: Occasional            MICROBIOLOGY:          RADIOLOGY & ADDITIONAL STUDIES:

## 2021-06-12 NOTE — DISCHARGE NOTE PROVIDER - CARE PROVIDERS DIRECT ADDRESSES
,DirectAddress_Unknown ,DirectAddress_Unknown,[email or web address]

## 2021-06-12 NOTE — DISCHARGE NOTE PROVIDER - NSDCACTIVITY_GEN_ALL_CORE
No restrictions No restrictions/Bathing allowed/Showering allowed/Walking - Indoors allowed/No heavy lifting/straining

## 2021-06-12 NOTE — PROGRESS NOTE ADULT - PROBLEM SELECTOR PLAN 3
VTE ppx with daily Lovenox 40mg subQ daily    IMPROVE VTE Individual Risk Assessment          RISK                                                          Points  [  ] Previous VTE                                                3  [  ] Thrombophilia                                             2  [  ] Lower limb paralysis                                   2        (unable to hold up >15 seconds)    [  ] Current Cancer                                             2         (within 6 months)  [  ] Immobilization > 24 hrs                              1  [  ] ICU/CCU stay > 24 hours                             1  [ x ] Age > 60                                                         1    IMPROVE VTE Score: 1 - Mild hypokalemia present on admission, likely due to patient report of diarrhea, repleted with PO 40mEq tab - RESOLVED  - Hypocalcemia present, however when corrected for hypoalbuminemia, Ca 8.5 today, WNL  - Monitor daily electrolytes

## 2021-06-12 NOTE — DISCHARGE NOTE PROVIDER - HOSPITAL COURSE
FROM ADMISSION HPI: 70yo F, with no significant PMH, presenting from home with complaint of cough and fever. Patient felt ill on Saturday, stating she had a headache. She awoke feeling well again on Sunday, however by Monday, she had developed a fever of 101 deg. She initially presented to an urgent care center that day and was told her symptoms were likely viral in nature. Symptoms persisted, and she visited her PCP Dr. Duron on Wednesday, at which time patient was diagnosed with pneumonia by CXR and was prescribed azithromycin and Ceftin. Fever has continued with Tmax 103 deg this AM, which has responded to Tylenol, last taken at 7:30 AM. Currently, patient endorses nonproductive cough. She notes she had liquidy diarrhea for the past few days but has not had loose BMs today at all. She also states her appetite has decreased over the last few days but at time of my encounter, she feels hungry. Otherwise denies vision/hearing changes, chest pain, palpitations, dyspnea, abd pain, N/V, urinary symptoms, skin changes, or new myalgia/arthralgia.    In the ED  VS: T 97 HR 74 /74 RR 16 SpO2 97% on room air  Significant labs include K 3.4, Ca 7.9 (corrected 8.7), alb 3.0, AST 53.  CXR: infiltrate of RLL superior segment  CT chest: RLL PNA  EKG: NSR with no concerning ST segment/Tw abnormalities  Patient received 1950cc NS, 1x Zosyn, and 1x azithromycin.     ---  HOSPITAL COURSE: Patient was admitted for community-acquired PNA after failing outpatient PO treatment.     PT consulted, recommends discharge ______    Patient showed improvement throughout hospitalization. Patient was seen and examined on day of discharge:     VITALS:       PHYSICAL EXAM:    Patient was medically optimized for discharge with close outpatient follow up.    ---  CONSULTANTS:   ID- Dr. Szymanski    ---  TIME SPENT:  I, the attending physician, was physically present for the key portions of the evaluation and management (E/M) service provided. The total amount of time spent reviewing the hospital notes, laboratory values, imaging findings, assessing/counseling the patient, discussing with consultant physicians, social work, nursing staff was -- minutes    ---  FINAL DISCHARGE DIAGNOSIS LIST:  Please see last daily progress note for final discharge diagnoses    ---  Primary care provider was made aware of plan for discharge:      [  ] NO     [  ] YES   FROM ADMISSION HPI: 72yo F, with no significant PMH, presenting from home with complaint of cough and fever. Patient felt ill on Saturday, stating she had a headache. She awoke feeling well again on Sunday, however by Monday, she had developed a fever of 101 deg. She initially presented to an urgent care center that day and was told her symptoms were likely viral in nature. Symptoms persisted, and she visited her PCP Dr. Duron on Wednesday, at which time patient was diagnosed with pneumonia by CXR and was prescribed azithromycin and Ceftin. Fever has continued with Tmax 103 deg this AM, which has responded to Tylenol, last taken at 7:30 AM. Currently, patient endorses nonproductive cough. She notes she had liquidy diarrhea for the past few days but has not had loose BMs today at all. She also states her appetite has decreased over the last few days but at time of my encounter, she feels hungry. Otherwise denies vision/hearing changes, chest pain, palpitations, dyspnea, abd pain, N/V, urinary symptoms, skin changes, or new myalgia/arthralgia.    In the ED  VS: T 97 HR 74 /74 RR 16 SpO2 97% on room air  Significant labs include K 3.4, Ca 7.9 (corrected 8.7), alb 3.0, AST 53.  CXR: infiltrate of RLL superior segment  CT chest: RLL PNA  EKG: NSR with no concerning ST segment/Tw abnormalities  Patient received 1950cc NS, 1x Zosyn, and 1x azithromycin.     ---  HOSPITAL COURSE: Patient was admitted for community-acquired PNA after failing outpatient PO treatment. CXR revealed nfiltrate in the superior segment of the right lower lobe with smaller infiltrate inferiorly. CT chest non con shows RLL PNA. Tmax 100.6F inpatient. Infectious disease specialist, Dr. Szymanski was consulted for the case. Pt was treated with IV antibiotics and PO Azithromycin, Albuterol HFA TID and benzonatate PRN with clinical improvement. Blood and urine cultures remained negative. RVP neg Legionella ag negative.    Patient showed improvement throughout hospitalization. Patient was seen and examined on day of discharge:     Vital Signs Last 24 Hrs  T(C): 37.3 (13 Jun 2021 05:14), Max: 37.3 (13 Jun 2021 05:14)  T(F): 99.2 (13 Jun 2021 05:14), Max: 99.2 (13 Jun 2021 05:14)  HR: 67 (13 Jun 2021 05:14) (67 - 84)  BP: 118/72 (13 Jun 2021 05:14) (106/68 - 118/72)  BP(mean): --  RR: 18 (13 Jun 2021 05:14) (16 - 18)  SpO2: 91% (13 Jun 2021 05:14) (91% - 95%)    PHYSICAL EXAM:  GENERAL:  NAD,  Well appearing,   HEAD:   Normal,   EYES:  EOMI, PERRLA, Conjunctiva and sclera clear normal,  ENMT:  Normal,  Moist mucous membranes, Good dentition, No thrush  NECK:   Supple, No JVD, Normal thyroid,  Lymphadenopathy,  CHEST/LUNG: Breath Sounds decrease Rt LL, RLL RALES, + rhonchi, No wheezing  HEART:  Regular rate and rhythm, No murmurs, No rubs, No gallops,  ABDOMEN:  Soft, Nontender, Nondistended, No mass,  Bowel sounds present,   NERVOUS SYSTEM:  Alert & Oriented x3, Nonfocal,  EXTREMITIES:  2+ Peripheral Pulses, No clubbing, No cyanosis,   LYMPH:  No lymphadenopathy noted  SKIN: No rashes or lesions,     Patient was medically optimized for discharge home with close outpatient follow up.    ---  CONSULTANTS:   ID- Dr. Szymanski    ---  TIME SPENT:  I, the attending physician, was physically present for the key portions of the evaluation and management (E/M) service provided. The total amount of time spent reviewing the hospital notes, laboratory values, imaging findings, assessing/counseling the patient, discussing with consultant physicians, social work, nursing staff was -- minutes    ---  FINAL DISCHARGE DIAGNOSIS LIST:  Please see last daily progress note for final discharge diagnoses    ---  Primary care provider was made aware of plan for discharge:      [  ] NO     [  ] YES   FROM ADMISSION HPI: 70yo F, with no significant PMH, presenting from home with complaint of cough and fever. Patient felt ill on Saturday, stating she had a headache. She awoke feeling well again on Sunday, however by Monday, she had developed a fever of 101 deg. She initially presented to an urgent care center that day and was told her symptoms were likely viral in nature. Symptoms persisted, and she visited her PCP Dr. Duron on Wednesday, at which time patient was diagnosed with pneumonia by CXR and was prescribed azithromycin and Ceftin. Fever has continued with Tmax 103 deg this AM, which has responded to Tylenol, last taken at 7:30 AM. Currently, patient endorses nonproductive cough. She notes she had liquidy diarrhea for the past few days but has not had loose BMs today at all. She also states her appetite has decreased over the last few days but at time of my encounter, she feels hungry. Otherwise denies vision/hearing changes, chest pain, palpitations, dyspnea, abd pain, N/V, urinary symptoms, skin changes, or new myalgia/arthralgia.    In the ED  VS: T 97 HR 74 /74 RR 16 SpO2 97% on room air  Significant labs include K 3.4, Ca 7.9 (corrected 8.7), alb 3.0, AST 53.  CXR: infiltrate of RLL superior segment  CT chest: RLL PNA  EKG: NSR with no concerning ST segment/Tw abnormalities  Patient received 1950cc NS, 1x Zosyn, and 1x azithromycin.     ---  HOSPITAL COURSE: Patient was admitted for community-acquired PNA after failing outpatient PO treatment. CXR revealed nfiltrate in the superior segment of the right lower lobe with smaller infiltrate inferiorly. CT chest non con shows RLL PNA. Tmax 100.6F inpatient. Infectious disease specialist, Dr. Szymanski was consulted for the case. Pt was treated with IV antibiotics and PO Azithromycin, Albuterol HFA TID and benzonatate PRN with clinical improvement. Blood and urine cultures remained negative. RVP neg Legionella ag negative. She will be discharged on Cefuroxime 500mg PO BID with last day 6/15.    Patient showed improvement throughout hospitalization. Patient was seen and examined on day of discharge:     Vital Signs Last 24 Hrs  T(C): 37.3 (13 Jun 2021 05:14), Max: 37.3 (13 Jun 2021 05:14)  T(F): 99.2 (13 Jun 2021 05:14), Max: 99.2 (13 Jun 2021 05:14)  HR: 67 (13 Jun 2021 05:14) (67 - 84)  BP: 118/72 (13 Jun 2021 05:14) (106/68 - 118/72)  BP(mean): --  RR: 18 (13 Jun 2021 05:14) (16 - 18)  SpO2: 91% (13 Jun 2021 05:14) (91% - 95%)    PHYSICAL EXAM:  GENERAL:  NAD,  Well appearing,   HEAD:   Normal,   EYES:  EOMI, PERRLA, Conjunctiva and sclera clear normal,  ENMT:  Normal,  Moist mucous membranes, Good dentition, No thrush  NECK:   Supple, No JVD, Normal thyroid,  Lymphadenopathy,  CHEST/LUNG: Breath Sounds decrease Rt LL, RLL RALES, + rhonchi, No wheezing  HEART:  Regular rate and rhythm, No murmurs, No rubs, No gallops,  ABDOMEN:  Soft, Nontender, Nondistended, No mass,  Bowel sounds present,   NERVOUS SYSTEM:  Alert & Oriented x3, Nonfocal,  EXTREMITIES:  2+ Peripheral Pulses, No clubbing, No cyanosis,   LYMPH:  No lymphadenopathy noted  SKIN: No rashes or lesions,     Patient was medically optimized for discharge home with close outpatient follow up.    ---  CONSULTANTS:   FARRUKH Szymanski    ---  TIME SPENT:  I, the attending physician, was physically present for the key portions of the evaluation and management (E/M) service provided. The total amount of time spent reviewing the hospital notes, laboratory values, imaging findings, assessing/counseling the patient, discussing with consultant physicians, social work, nursing staff was -- minutes    ---  FINAL DISCHARGE DIAGNOSIS LIST:  Please see last daily progress note for final discharge diagnoses    ---  Primary care provider was made aware of plan for discharge:      [  ] NO     [  ] YES   FROM ADMISSION HPI: 70yo F, with no significant PMH, presenting from home with complaint of cough and fever. Patient felt ill on Saturday, stating she had a headache. She awoke feeling well again on Sunday, however by Monday, she had developed a fever of 101 deg. She initially presented to an urgent care center that day and was told her symptoms were likely viral in nature. Symptoms persisted, and she visited her PCP Dr. Duron on Wednesday, at which time patient was diagnosed with pneumonia by CXR and was prescribed azithromycin and Ceftin. Fever has continued with Tmax 103 deg this AM, which has responded to Tylenol, last taken at 7:30 AM. Currently, patient endorses nonproductive cough. She notes she had liquidy diarrhea for the past few days but has not had loose BMs today at all. She also states her appetite has decreased over the last few days but at time of my encounter, she feels hungry. Otherwise denies vision/hearing changes, chest pain, palpitations, dyspnea, abd pain, N/V, urinary symptoms, skin changes, or new myalgia/arthralgia.    In the ED  VS: T 97 HR 74 /74 RR 16 SpO2 97% on room air  Significant labs include K 3.4, Ca 7.9 (corrected 8.7), alb 3.0, AST 53.  CXR: infiltrate of RLL superior segment  CT chest: RLL PNA  EKG: NSR with no concerning ST segment/Tw abnormalities  Patient received 1950cc NS, 1x Zosyn, and 1x azithromycin.     ---  HOSPITAL COURSE: Patient was admitted for community-acquired PNA after failing outpatient PO treatment. CXR revealed nfiltrate in the superior segment of the right lower lobe with smaller infiltrate inferiorly. CT chest non con shows RLL PNA. Tmax 100.6F inpatient. Infectious disease specialist, Dr. Szymanski was consulted for the case. Pt was treated with IV antibiotics and PO Azithromycin, Albuterol HFA TID and benzonatate PRN with clinical improvement. Blood and urine cultures remained negative. RVP neg Legionella ag negative. She will be discharged on Cefuroxime 500mg PO BID with last day 6/15.    Patient showed improvement throughout hospitalization. Patient was seen and examined on day of discharge:     Vital Signs Last 24 Hrs  T(C): 37.3 (13 Jun 2021 05:14), Max: 37.3 (13 Jun 2021 05:14)  T(F): 99.2 (13 Jun 2021 05:14), Max: 99.2 (13 Jun 2021 05:14)  HR: 67 (13 Jun 2021 05:14) (67 - 84)  BP: 118/72 (13 Jun 2021 05:14) (106/68 - 118/72)  BP(mean): --  RR: 18 (13 Jun 2021 05:14) (16 - 18)  SpO2: 91% (13 Jun 2021 05:14) (91% - 95%)    PHYSICAL EXAM:  GENERAL:  NAD,  Well appearing,   HEAD:   Normal,   EYES:  EOMI, PERRLA, Conjunctiva and sclera clear normal,  ENMT:  Normal,  Moist mucous membranes, Good dentition, No thrush  NECK:   Supple, No JVD, Normal thyroid,  Lymphadenopathy,  CHEST/LUNG: Breath Sounds decrease Rt LL, RLL RALES, + rhonchi, No wheezing  HEART:  Regular rate and rhythm, No murmurs, No rubs, No gallops,  ABDOMEN:  Soft, Nontender, Nondistended, No mass,  Bowel sounds present,   NERVOUS SYSTEM:  Alert & Oriented x3, Nonfocal,  EXTREMITIES:  2+ Peripheral Pulses, No clubbing, No cyanosis,   LYMPH:  No lymphadenopathy noted  SKIN: No rashes or lesions,     Patient was medically optimized for discharge home with close outpatient follow up.    ---  CONSULTANTS:   ID- Dr. Szymanski    ---  TIME SPENT:  I, the attending physician, was physically present for the key portions of the evaluation and management (E/M) service provided. The total amount of time spent reviewing the hospital notes, laboratory values, imaging findings, assessing/counseling the patient, discussing with consultant physicians, social work, nursing staff was -- minutes    ---  FINAL DISCHARGE DIAGNOSIS LIST:  Please see last daily progress note for final discharge diagnoses   FROM ADMISSION HPI: 72yo F, with no significant PMH, presenting from home with complaint of cough and fever. Patient felt ill on Saturday, stating she had a headache. She awoke feeling well again on Sunday, however by Monday, she had developed a fever of 101 deg. She initially presented to an urgent care center that day and was told her symptoms were likely viral in nature. Symptoms persisted, and she visited her PCP Dr. Duron on Wednesday, at which time patient was diagnosed with pneumonia by CXR and was prescribed azithromycin and Ceftin. Fever has continued with Tmax 103 deg this AM, which has responded to Tylenol, last taken at 7:30 AM. Currently, patient endorses nonproductive cough. She notes she had liquidy diarrhea for the past few days but has not had loose BMs today at all. She also states her appetite has decreased over the last few days but at time of my encounter, she feels hungry. Otherwise denies vision/hearing changes, chest pain, palpitations, dyspnea, abd pain, N/V, urinary symptoms, skin changes, or new myalgia/arthralgia.    In the ED  VS: T 97 HR 74 /74 RR 16 SpO2 97% on room air  Significant labs include K 3.4, Ca 7.9 (corrected 8.7), alb 3.0, AST 53.  CXR: infiltrate of RLL superior segment  CT chest: RLL PNA  EKG: NSR with no concerning ST segment/Tw abnormalities  Patient received 1950cc NS, 1x Zosyn, and 1x azithromycin.     ---  HOSPITAL COURSE: Patient was admitted for community-acquired PNA after failing outpatient PO treatment. CXR revealed nfiltrate in the superior segment of the right lower lobe with smaller infiltrate inferiorly. CT chest non con shows RLL PNA. Tmax 100.6F inpatient. Infectious disease specialist, Dr. Szymanski was consulted for the case. Pt was treated with IV antibiotics and PO Azithromycin, Albuterol HFA TID and benzonatate PRN with clinical improvement. Blood and urine cultures remained negative. RVP neg Legionella ag negative. She will be discharged on Cefuroxime 500mg PO BID with last day 6/15/21 Pt completed Azithromycin , pt to follow up with PMD Next week , Repeat CXR after 8 weeks with PMD as out pt.    Patient showed improvement throughout hospitalization. Patient was seen and examined on day of discharge:       Patient was medically optimized for discharge home with close outpatient follow up.    ---  CONSULTANTS:   FARRUKH Szymanski    ---  TIME SPENT:  I, the attending physician, was physically present for the key portions of the evaluation and management (E/M) service provided. The total amount of time spent reviewing the hospital notes, laboratory values, imaging findings, assessing/counseling the patient, discussing with consultant physicians, social work, nursing staff was 50 minutes    ---  FINAL DISCHARGE DIAGNOSIS LIST:  Please see last daily progress note for final discharge diagnoses

## 2021-06-12 NOTE — PROGRESS NOTE ADULT - SUBJECTIVE AND OBJECTIVE BOX
Patient is a 71y old  Female who presents with a chief complaint of PNA     70yo F, with no significant PMH, presenting from home with complaint of cough and fever. Patient felt ill on Saturday, stating she had a headache. She awoke feeling well again on , however by Monday, she had developed a fever of 101 deg. She initially presented to an urgent care center that day and was told her symptoms were likely viral in nature. Symptoms persisted, and she visited her PCP Dr. Duron on Wednesday, at which time patient was diagnosed with pneumonia by CXR and was prescribed azithromycin and Ceftin. Fever has continued with Tmax 103 deg this AM, which has responded to Tylenol, last taken at 7:30 AM. Currently, patient endorses nonproductive cough. She notes she had liquidy diarrhea for the past few days but has not had loose BMs today at all. She also states her appetite has decreased over the last few days but at time of my encounter, she feels hungry. Otherwise denies vision/hearing changes, chest pain, palpitations, dyspnea, abd pain, N/V, urinary symptoms, skin changes, or new myalgia/arthralgia.    In the ED  VS: T 97 HR 74 /74 RR 16 SpO2 97% on room air  Significant labs include K 3.4, Ca 7.9 (corrected 8.7), alb 3.0, AST 53.  CXR: infiltrate of RLL superior segment  CT chest: RLL PNA  EKG: NSR with no concerning ST segment/Tw abnormalities    Patient received 1950cc NS, 1x Zosyn, and 1x azithromycin.    INTERVAL HPI:  : Patient seen and examined at bedside. Patient admitted yesterday for CAP after failing outpatient treatment with PO Ceftin and azithromycin. Now on IV Zosyn, PO azithromycin.     OVERNIGHT EVENTS: Patient febrile, Tmax 100.6F. No other events reported.     MEDICATIONS  (STANDING):  ALBUTerol    90 MICROgram(s) HFA Inhaler 2 Puff(s) Inhalation three times a day  azithromycin   Tablet 500 milliGRAM(s) Oral daily  enoxaparin Injectable 40 milliGRAM(s) SubCutaneous daily  piperacillin/tazobactam IVPB.. 3.375 Gram(s) IV Intermittent every 8 hours  saccharomyces boulardii 250 milliGRAM(s) Oral two times a day  sodium chloride 0.9%. 1000 milliLiter(s) (50 mL/Hr) IV Continuous <Continuous>    MEDICATIONS  (PRN):  acetaminophen   Tablet .. 650 milliGRAM(s) Oral every 6 hours PRN Temp greater or equal to 38C (100.4F)  benzonatate 200 milliGRAM(s) Oral three times a day PRN Cough  guaiFENesin Oral Liquid (Sugar-Free) 200 milliGRAM(s) Oral every 6 hours PRN Cough    Allergies  No Known Allergies    REVIEW OF SYSTEMS:  CONSTITUTIONAL: [ x ] Fever, No chills, No fatigue, No myalgia, No Body ache, No Weakness  EYES: No eye pain,  No visual disturbances, No discharge, No Redness  ENMT: No ear pain, No nose bleed, No vertigo; No sinus or throat pain, No Congestion  NECK: No pain, No stiffness  RESPIRATORY: [ x ] cough, No wheezing, No hemoptysis, No shortness of breath  CARDIOVASCULAR: No chest pain, palpitations  GASTROINTESTINAL: No abdominal or epigastric pain. No nausea, No vomiting, No diarrhea or constipation; [  ] BM  GENITOURINARY: No dysuria, No frequency, No urgency, No hematuria or incontinence  NEUROLOGICAL: No headaches, No dizziness, No numbness, No tingling, No tremors, No weakness  EXT: No Swelling, No Pain, No Edema  SKIN: [ x ] No itching, burning, rashes, or lesions   MUSCULOSKELETAL: No joint pain or swelling; No muscle pain, No back pain, No extremity pain  PSYCHIATRIC: No depression, anxiety, mood swings or difficulty sleeping at night  PAIN SCALE: [ x ] None  [  ] Other-  ROS Unable to obtain due to: [  ] Dementia  [  ] Lethargy  [  ] Sedated  [  ]  non verbal  REST OF REVIEW OF SYSTEMS: [ x ] Normal     Vital Signs Last 24 Hrs  T(C): 37.1 (2021 05:33), Max: 38.1 (2021 19:20)  T(F): 98.7 (2021 05:33), Max: 100.6 (2021 19:20)  HR: 70 (2021 05:33) (62 - 82)  BP: 120/70 (2021 05:33) (118/74 - 146/69)  BP(mean): --  RR: 18 (2021 05:33) (16 - 18)  SpO2: 91% (2021 05:33) (91% - 97%)  Finger Stick      06-11 @ 07:01  -  06-12 @ 07:00  --------------------------------------------------------  IN: 465 mL / OUT: 800 mL / NET: -335 mL      PHYSICAL EXAM:  GENERAL:  [  ] NAD, [  ] Well appearing, [  ] Agitated, [  ] Drowsy, [  ] Lethargy, [  ] Confused   HEAD:  [  ] Normal, [  ] Other  EYES:  [  ] EOMI, [  ] PERRLA, [  ] Conjunctiva and sclera clear normal, [  ] Other, [  ] Pallor, [  ] Discharge  ENMT:  [  ] Normal, [  ] Moist mucous membranes, [  ] Good dentition, [  ] No thrush  NECK:  [  ] Supple, [  ] No JVD, [  ] Normal thyroid, [  ] Lymphadenopathy, [  ] Other  CHEST/LUNG:  [  ] Clear to auscultation bilaterally, [  ] Breath Sounds equal OR decreased, [  ] No rales, [  ] No rhonchi, [  ] No wheezing  HEART:  [  ] Regular rate and rhythm, [  ] Tachycardia, [  ] Bradycardia, [  ] Irregular, [  ] No murmurs, No rubs, No gallops, [  ] PPM in place (Mfr:  )  ABDOMEN:  [  ] Soft, [  ] Nontender, [  ] Nondistended, [  ] No mass, [  ] Bowel sounds present, [  ] Obese  NERVOUS SYSTEM:  [  ] Alert & Oriented x3, [  ] Nonfocal, [  ] Confusion, [  ] Encephalopathic, [  ] Sedated, [  ] Unable to assess, [  ] Other-  EXTREMITIES:  [  ] 2+ Peripheral Pulses, No clubbing, No cyanosis,  [  ] edema B/L lower EXT, [  ] PVD stasis skin changes B/L lower EXT  LYMPH:  No lymphadenopathy noted  SKIN:  [  ] No rashes or lesions, [  ] Pressure ulcers, [  ] Ecchymosis, [  ] Skin tears, [  ] Other    DIET: PO- Regular     LABS:                        10.5   4.91  )-----------( 180      ( 2021 06:54 )             30.7     2021 06:54    139    |  109    |  7      ----------------------------<  111    3.9     |  23     |  0.51     Ca    7.5        2021 06:54    TPro  5.8    /  Alb  2.8    /  TBili  0.7    /  DBili  x      /  AST  49     /  ALT  42     /  AlkPhos  37     2021 06:54    PT/INR - ( 2021 12:41 )   PT: 13.1 sec;   INR: 1.13 ratio         PTT - ( 2021 12:41 )  PTT:28.7 sec  Urinalysis Basic - ( 2021 16:58 )    Color: Pale Yellow / Appearance: Clear / S.005 / pH: x  Gluc: x / Ketone: Negative  / Bili: Negative / Urobili: Negative   Blood: x / Protein: Negative / Nitrite: Negative   Leuk Esterase: Negative / RBC: 0-2 /HPF / WBC Negative   Sq Epi: x / Non Sq Epi: Occasional / Bacteria: Occasional    RADIOLOGY & ADDITIONAL TESTS:    Xray Chest 2 Views PA/Lat: There is infiltrate in the superior segment of the right lower lobe with smaller infiltrate inferiorly    CT Chest No Cont: LUNGS AND AIRWAYS: Patent central airways.Right lower lobe airspace consolidation. The remaining lung fields are clear.      HEALTH ISSUES - PROBLEM Dx:  Community acquired pneumonia  Need for prophylactic measure  Electrolyte disturbance    Consultant(s) Notes Reviewed:  [ x ] YES     Care Discussed with [ x ] Consultants, [ x ] Patient, [ x ] Family, [  ] , [  ] Social Service, [  ] RN, [  ] Physical Therapy  DVT PPX: [ x ] Lovenox, [  ] S C Heparin, [  ] Coumadin, [  ] Xarelto, [  ] Eliquis, [  ] Pradaxa, [  ] IV Heparin drip, [  ] SCD, [  ] Contraindication 2 to GI Bleed, [  ] Ambulation  Advanced Directive: [  ] None, [  ] DNR/DNI Patient is a 71y old  Female who presents with a chief complaint of PNA     72yo F, with no significant PMH, presenting from home with complaint of cough and fever. Patient felt ill on Saturday, stating she had a headache. She awoke feeling well again on , however by Monday, she had developed a fever of 101 deg. She initially presented to an urgent care center that day and was told her symptoms were likely viral in nature. Symptoms persisted, and she visited her PCP Dr. Duron on Wednesday, at which time patient was diagnosed with pneumonia by CXR and was prescribed azithromycin and Ceftin. Fever has continued with Tmax 103 deg this AM, which has responded to Tylenol, last taken at 7:30 AM. Currently, patient endorses nonproductive cough. She notes she had liquidy diarrhea for the past few days but has not had loose BMs today at all. She also states her appetite has decreased over the last few days but at time of my encounter, she feels hungry. Otherwise denies vision/hearing changes, chest pain, palpitations, dyspnea, abd pain, N/V, urinary symptoms, skin changes, or new myalgia/arthralgia.    In the ED  VS: T 97 HR 74 /74 RR 16 SpO2 97% on room air  Significant labs include K 3.4, Ca 7.9 (corrected 8.7), alb 3.0, AST 53.  CXR: infiltrate of RLL superior segment  CT chest: RLL PNA  EKG: NSR with no concerning ST segment/Tw abnormalities    Patient received 1950cc NS, 1x Zosyn, and 1x azithromycin.    INTERVAL HPI:  : Patient seen and examined at bedside. Patient admitted yesterday for CAP after failing outpatient treatment with PO Ceftin and azithromycin. Now on IV Zosyn, PO azithromycin. ID recs IV Rocephin, PO azithromycin. Patient reports feeling some improvement when compared with yesterday though still with persistent nonproductive cough and some diarrhea and chills. No SOB or chest pain.    OVERNIGHT EVENTS: Patient febrile, Tmax 100.6F. No other events reported.     MEDICATIONS  (STANDING):  ALBUTerol    90 MICROgram(s) HFA Inhaler 2 Puff(s) Inhalation three times a day  azithromycin   Tablet 500 milliGRAM(s) Oral daily  enoxaparin Injectable 40 milliGRAM(s) SubCutaneous daily  piperacillin/tazobactam IVPB.. 3.375 Gram(s) IV Intermittent every 8 hours  saccharomyces boulardii 250 milliGRAM(s) Oral two times a day  sodium chloride 0.9%. 1000 milliLiter(s) (50 mL/Hr) IV Continuous <Continuous>    MEDICATIONS  (PRN):  acetaminophen   Tablet .. 650 milliGRAM(s) Oral every 6 hours PRN Temp greater or equal to 38C (100.4F)  benzonatate 200 milliGRAM(s) Oral three times a day PRN Cough  guaiFENesin Oral Liquid (Sugar-Free) 200 milliGRAM(s) Oral every 6 hours PRN Cough    Allergies  No Known Allergies    REVIEW OF SYSTEMS:  CONSTITUTIONAL: [ x ] Fever, No chills, No fatigue, No myalgia, No Body ache, No Weakness  EYES: No eye pain,  No visual disturbances, No discharge, No Redness  ENMT: No ear pain, No nose bleed, No vertigo; No sinus or throat pain, No Congestion  NECK: No pain, No stiffness  RESPIRATORY: [ x ] cough, No wheezing, No hemoptysis, No shortness of breath  CARDIOVASCULAR: No chest pain, palpitations  GASTROINTESTINAL: [ x ] nonbloody diarrhea. No abdominal or epigastric pain. No nausea, No vomiting or constipation; [  ] BM  GENITOURINARY: No dysuria, No frequency, No urgency, No hematuria or incontinence  NEUROLOGICAL: No headaches, No dizziness, No numbness, No tingling, No tremors, No weakness  EXT: No Swelling, No Pain, No Edema  SKIN: [ x ] No itching, burning, rashes, or lesions   MUSCULOSKELETAL: No joint pain or swelling; No muscle pain, No back pain, No extremity pain  PSYCHIATRIC: No depression, anxiety, mood swings or difficulty sleeping at night  PAIN SCALE: [ x ] None  [  ] Other-  ROS Unable to obtain due to: [  ] Dementia  [  ] Lethargy  [  ] Sedated  [  ]  non verbal  REST OF REVIEW OF SYSTEMS: [ x ] Normal     Vital Signs Last 24 Hrs  T(C): 37.1 (2021 05:33), Max: 38.1 (2021 19:20)  T(F): 98.7 (2021 05:33), Max: 100.6 (2021 19:20)  HR: 70 (2021 05:33) (62 - 82)  BP: 120/70 (2021 05:33) (118/74 - 146/69)  BP(mean): --  RR: 18 (2021 05:33) (16 - 18)  SpO2: 91% (2021 05:33) (91% - 97%)  Finger Stick      06-11 @ 07:01  -  06-12 @ 07:00  --------------------------------------------------------  IN: 465 mL / OUT: 800 mL / NET: -335 mL      PHYSICAL EXAM:  GENERAL:  [ x ] NAD, [ x ] Well appearing, [  ] Agitated, [  ] Drowsy, [  ] Lethargy, [  ] Confused   HEAD:  [ x ] Normal, [  ] Other  EYES:  [ x ] EOMI, [ x ] PERRLA, [ x ] Conjunctiva and sclera clear normal, [  ] Other, [  ] Pallor, [  ] Discharge  ENMT:  [ x ] Normal, [ x ] Moist mucous membranes, [ x ] Good dentition, [  ] No thrush  NECK:  [ x ] Supple, [ x ] No JVD, [ x ] Normal thyroid, [  ] Lymphadenopathy, [  ] Other  CHEST/LUNG:  [  ] Clear to auscultation bilaterally, [  ] Breath Sounds equal, [ x ] RLL RALES, [  ] No rhonchi, [  ] No wheezing  HEART:  [ x ] Regular rate and rhythm, [  ] Tachycardia, [  ] Bradycardia, [  ] Irregular, [ x ] No murmurs, No rubs, No gallops, [  ] PPM in place (Mfr:  )  ABDOMEN:  [ x ] Soft, [ x ] Nontender, [ x ] Nondistended, [ x ] No mass, [ x ] Bowel sounds present, [  ] Obese  NERVOUS SYSTEM:  [ x ] Alert & Oriented x3, [ x ] Nonfocal, [  ] Confusion, [  ] Encephalopathic, [  ] Sedated, [  ] Unable to assess, [  ] Other-  EXTREMITIES:  [ x ] 2+ Peripheral Pulses, No clubbing, No cyanosis,  [  ] edema B/L lower EXT, [  ] PVD stasis skin changes B/L lower EXT  LYMPH:  No lymphadenopathy noted  SKIN:  [ x ] No rashes or lesions, [  ] Pressure ulcers, [  ] Ecchymosis, [  ] Skin tears, [  ] Other    DIET: PO- Regular     LABS:                        10.5   4.91  )-----------( 180      ( 2021 06:54 )             30.7     2021 06:54    139    |  109    |  7      ----------------------------<  111    3.9     |  23     |  0.51     Ca    7.5        2021 06:54    TPro  5.8    /  Alb  2.8    /  TBili  0.7    /  DBili  x      /  AST  49     /  ALT  42     /  AlkPhos  37     2021 06:54    PT/INR - ( 2021 12:41 )   PT: 13.1 sec;   INR: 1.13 ratio         PTT - ( 2021 12:41 )  PTT:28.7 sec  Urinalysis Basic - ( 2021 16:58 )    Color: Pale Yellow / Appearance: Clear / S.005 / pH: x  Gluc: x / Ketone: Negative  / Bili: Negative / Urobili: Negative   Blood: x / Protein: Negative / Nitrite: Negative   Leuk Esterase: Negative / RBC: 0-2 /HPF / WBC Negative   Sq Epi: x / Non Sq Epi: Occasional / Bacteria: Occasional    RADIOLOGY & ADDITIONAL TESTS:    Xray Chest 2 Views PA/Lat: There is infiltrate in the superior segment of the right lower lobe with smaller infiltrate inferiorly    CT Chest No Cont: LUNGS AND AIRWAYS: Patent central airways.Right lower lobe airspace consolidation. The remaining lung fields are clear.      HEALTH ISSUES - PROBLEM Dx:  Community acquired pneumonia  Need for prophylactic measure  Electrolyte disturbance    Consultant(s) Notes Reviewed:  [ x ] YES     Care Discussed with [ x ] Consultants, [ x ] Patient, [ x ] Family, [  ] , [  ] Social Service, [  ] RN, [  ] Physical Therapy  DVT PPX: [ x ] Lovenox, [  ] S C Heparin, [  ] Coumadin, [  ] Xarelto, [  ] Eliquis, [  ] Pradaxa, [  ] IV Heparin drip, [  ] SCD, [  ] Contraindication 2 to GI Bleed, [  ] Ambulation  Advanced Directive: [  ] None, [  ] DNR/DNI Patient is a 71y old  Female who presents with a chief complaint of PNA     70yo F, with no significant PMH, presenting from home with complaint of cough and fever. Patient felt ill on Saturday, stating she had a headache. She awoke feeling well again on , however by Monday, she had developed a fever of 101 deg. She initially presented to an urgent care center that day and was told her symptoms were likely viral in nature. Symptoms persisted, and she visited her PCP Dr. Duron on Wednesday, at which time patient was diagnosed with pneumonia by CXR and was prescribed azithromycin and Ceftin. Fever has continued with Tmax 103 deg this AM, which has responded to Tylenol, last taken at 7:30 AM. Currently, patient endorses nonproductive cough. She notes she had liquidy diarrhea for the past few days but has not had loose BMs today at all. She also states her appetite has decreased over the last few days but at time of my encounter, she feels hungry. Otherwise denies vision/hearing changes, chest pain, palpitations, dyspnea, abd pain, N/V, urinary symptoms, skin changes, or new myalgia/arthralgia.    In the ED  VS: T 97 HR 74 /74 RR 16 SpO2 97% on room air  Significant labs include K 3.4, Ca 7.9 (corrected 8.7), alb 3.0, AST 53.  CXR: infiltrate of RLL superior segment  CT chest: RLL PNA  EKG: NSR with no concerning ST segment/Tw abnormalities    Patient received 1950cc NS, 1x Zosyn, and 1x azithromycin.    INTERVAL HPI:  : Patient seen and examined at bedside. Patient admitted yesterday for CAP after failing outpatient treatment with PO Ceftin and azithromycin. Now on IV Zosyn, PO azithromycin. ID recs IV Rocephin, PO azithromycin. Patient reports feeling some improvement when compared with yesterday though still with persistent nonproductive cough and some diarrhea and chills. No SOB or chest pain. Mild Anemia Low H/H    OVERNIGHT EVENTS: Patient febrile, Tmax 100.6F. No other events reported.     MEDICATIONS  (STANDING):  ALBUTerol    90 MICROgram(s) HFA Inhaler 2 Puff(s) Inhalation three times a day  azithromycin   Tablet 500 milliGRAM(s) Oral daily  enoxaparin Injectable 40 milliGRAM(s) SubCutaneous daily  piperacillin/tazobactam IVPB.. 3.375 Gram(s) IV Intermittent every 8 hours  saccharomyces boulardii 250 milliGRAM(s) Oral two times a day  sodium chloride 0.9%. 1000 milliLiter(s) (50 mL/Hr) IV Continuous <Continuous>    MEDICATIONS  (PRN):  acetaminophen   Tablet .. 650 milliGRAM(s) Oral every 6 hours PRN Temp greater or equal to 38C (100.4F)  benzonatate 200 milliGRAM(s) Oral three times a day PRN Cough  guaiFENesin Oral Liquid (Sugar-Free) 200 milliGRAM(s) Oral every 6 hours PRN Cough    Allergies  No Known Allergies    REVIEW OF SYSTEMS :feels better, tired  CONSTITUTIONAL: [ x ] Fever, No chills, No fatigue, No myalgia, No Body ache, + Weakness  EYES: No eye pain,  No visual disturbances, No discharge, No Redness  ENMT: No ear pain, No nose bleed, No vertigo; No sinus or throat pain, No Congestion  NECK: No pain, No stiffness  RESPIRATORY: [ x ] cough, No wheezing, No hemoptysis, No shortness of breath  CARDIOVASCULAR: No chest pain, palpitations  GASTROINTESTINAL: [ x ] nonbloody diarrhea. No abdominal or epigastric pain. No nausea, No vomiting or constipation; [  ] BM  GENITOURINARY: No dysuria, No frequency, No urgency, No hematuria or incontinence  NEUROLOGICAL: No headaches, No dizziness, No numbness, No tingling, No tremors, No weakness  EXT: No Swelling, No Pain, No Edema  SKIN: [ x ] No itching, burning, rashes, or lesions   MUSCULOSKELETAL: No joint pain or swelling; No muscle pain, No back pain, No extremity pain  PSYCHIATRIC: No depression, anxiety, mood swings or difficulty sleeping at night  PAIN SCALE: [ x ] None  [  ] Other-  ROS Unable to obtain due to: [  ] Dementia  [  ] Lethargy  [  ] Sedated  [  ]  non verbal  REST OF REVIEW OF SYSTEMS: [ x ] Normal     Vital Signs Last 24 Hrs  T(C): 37.1 (2021 05:33), Max: 38.1 (2021 19:20)  T(F): 98.7 (2021 05:33), Max: 100.6 (2021 19:20)  HR: 70 (2021 05:33) (62 - 82)  BP: 120/70 (2021 05:33) (118/74 - 146/69)  BP(mean): --  RR: 18 (2021 05:33) (16 - 18)  SpO2: 91% (2021 05:33) (91% - 97%)  Finger Stick      - @ 07:01  -  06- @ 07:00  --------------------------------------------------------  IN: 465 mL / OUT: 800 mL / NET: -335 mL      PHYSICAL EXAM:  GENERAL:  [ x ] NAD, [ x ] Well appearing, [  ] Agitated, [  ] Drowsy, [  ] Lethargy, [  ] Confused   HEAD:  [ x ] Normal, [  ] Other  EYES:  [ x ] EOMI, [ x ] PERRLA, [ x ] Conjunctiva and sclera clear normal, [  ] Other, [  ] Pallor, [  ] Discharge  ENMT:  [ x ] Normal, [ x ] Moist mucous membranes, [ x ] Good dentition, [x ] No thrush  NECK:  [ x ] Supple, [ x ] No JVD, [ x ] Normal thyroid, [  ] Lymphadenopathy, [  ] Other  CHEST/LUNG:  [  ] Clear to auscultation bilaterally, [x  ] Breath Sounds decrease Rt LL, [ x ] RLL RALES, [ x ] + rhonchi, [x  ] No wheezing  HEART:  [ x ] Regular rate and rhythm, [  ] Tachycardia, [  ] Bradycardia, [  ] Irregular, [ x ] No murmurs, No rubs, No gallops, [  ] PPM in place (Mfr:  )  ABDOMEN:  [ x ] Soft, [ x ] Nontender, [ x ] Nondistended, [ x ] No mass, [ x ] Bowel sounds present, [  ] Obese  NERVOUS SYSTEM:  [ x ] Alert & Oriented x3, [ x ] Nonfocal, [  ] Confusion, [  ] Encephalopathic, [  ] Sedated, [  ] Unable to assess, [  ] Other-  EXTREMITIES:  [ x ] 2+ Peripheral Pulses, No clubbing, No cyanosis,  [  ] edema B/L lower EXT, [  ] PVD stasis skin changes B/L lower EXT  LYMPH:  No lymphadenopathy noted  SKIN:  [ x ] No rashes or lesions, [  ] Pressure ulcers, [  ] Ecchymosis, [  ] Skin tears, [  ] Other    DIET: PO- Regular     LABS:                        10.5   4.91  )-----------( 180      ( 2021 06:54 )             30.7     2021 06:54    139    |  109    |  7      ----------------------------<  111    3.9     |  23     |  0.51     Ca    7.5        2021 06:54    TPro  5.8    /  Alb  2.8    /  TBili  0.7    /  DBili  x      /  AST  49     /  ALT  42     /  AlkPhos  37     2021 06:54    PT/INR - ( 2021 12:41 )   PT: 13.1 sec;   INR: 1.13 ratio         PTT - ( 2021 12:41 )  PTT:28.7 sec  Urinalysis Basic - ( 2021 16:58 )    Color: Pale Yellow / Appearance: Clear / S.005 / pH: x  Gluc: x / Ketone: Negative  / Bili: Negative / Urobili: Negative   Blood: x / Protein: Negative / Nitrite: Negative   Leuk Esterase: Negative / RBC: 0-2 /HPF / WBC Negative   Sq Epi: x / Non Sq Epi: Occasional / Bacteria: Occasional    RADIOLOGY & ADDITIONAL TESTS:    Xray Chest 2 Views PA/Lat: There is infiltrate in the superior segment of the right lower lobe with smaller infiltrate inferiorly    CT Chest No Cont: LUNGS AND AIRWAYS: Patent central airways.Right lower lobe airspace consolidation. The remaining lung fields are clear.      HEALTH ISSUES - PROBLEM Dx:  Community acquired pneumonia  Need for prophylactic measure  Electrolyte disturbance    Consultant(s) Notes Reviewed:  [ x ] YES     Care Discussed with [ x ] Consultants, [ x ] Patient, [ x ] Family, [  ] , [  ] Social Service, [ x ] RN, [  ] Physical Therapy  DVT PPX: [ x ] Lovenox, [  ] S C Heparin, [  ] Coumadin, [  ] Xarelto, [  ] Eliquis, [  ] Pradaxa, [  ] IV Heparin drip, [  ] SCD, [  ] Contraindication 2 to GI Bleed, [  ] Ambulation  Advanced Directive: [x  ] None, [  ] DNR/DNI

## 2021-06-12 NOTE — DISCHARGE NOTE PROVIDER - PROVIDER TOKENS
PROVIDER:[TOKEN:[[id number]],FOLLOWUP:[1-3 days],ESTABLISHEDPATIENT:[T]] PROVIDER:[TOKEN:[[id number]],FOLLOWUP:[1-3 days],ESTABLISHEDPATIENT:[T]],PROVIDER:[TOKEN:[[id number]]]

## 2021-06-12 NOTE — PROGRESS NOTE ADULT - ASSESSMENT
72yo F, with no significant PMH, presenting from home with complaint of cough, fever, and noted to have evidence of RLL airspace disease on exam and imaging    RECOMMENDATIONS    Story is compelling for Community Acquired PNA, RVP is negative and sent off additional testing to help guide therapy including MRSA nares PCR, urine for legionella and sputum, recommend standard CAP Rx with   Azithromycin/Ceftriaxone for now with recs to follow.

## 2021-06-12 NOTE — DISCHARGE NOTE PROVIDER - CARE PROVIDER_API CALL
Slavatore Duron)  [facility] [address] [facility]  [address] [place] [address]  Phone: [phone]  Fax: [phone]  Established Patient  Follow Up Time: 1-3 days   Salvatore Duron)  [facility] [address] [facility]  [address] [place] [address]  Phone: [phone]  Fax: [phone]  Established Patient  Follow Up Time: 1-3 days    Reji Szymanski; PhD)  Infectious Disease; Internal Medicine  [address] [place] [address]  Phone: [phone]  Fax: [phone]  Follow Up Time:

## 2021-06-12 NOTE — DISCHARGE NOTE PROVIDER - NSDCCPCAREPLAN_GEN_ALL_CORE_FT
PRINCIPAL DISCHARGE DIAGNOSIS  Diagnosis: Pneumonia  Assessment and Plan of Treatment:        PRINCIPAL DISCHARGE DIAGNOSIS  Diagnosis: Pneumonia  Assessment and Plan of Treatment: You were admitted for treatment of community acquired Right lower lobe pneumonia which was resistant to outpatient antibiotic treatment.  -You were evaluated by the primary medical team and infectious disease specialist.  -You improved after treatement with the appropriate intravenous and oral antibiotics  -Please take (oral antibiotic) for 5 more days  -Take benzonatate  as needed for your cough  -Follow up with your primary care physician in 1-3 days       PRINCIPAL DISCHARGE DIAGNOSIS  Diagnosis: Pneumonia  Assessment and Plan of Treatment: You were admitted for treatment of community acquired Right lower lobe pneumonia which was resistant to outpatient antibiotic treatment.  -You were evaluated by the primary medical team and infectious disease specialist.  -You improved after treatement with the appropriate intravenous and oral antibiotics  -Please take Cefuroxime 500mg PO two times a day with last day 6/15  -Take benzonatate  as needed for your cough. This is a cough suppressant.   -Take an over the counter expectorant like guaifenesin to thin secretions, make coughs more productive and relieve congestion  -Follow up with your primary care physician in 1-3 days       PRINCIPAL DISCHARGE DIAGNOSIS  Diagnosis: Pneumonia  Assessment and Plan of Treatment: You were admitted for treatment of community acquired Right lower lobe pneumonia which was resistant to outpatient antibiotic treatment.  -You were evaluated by the primary medical team and infectious disease specialist.  -You improved after treatement with the appropriate intravenous and oral antibiotics  -Please take Cefuroxime 500mg orally two times a day with LAST DAY 06/15/2021  -Take benzonatate (Tessalon Perles) 200 mg orally every 8 hours as needed for your cough. This is a cough suppressant.   -Take an over the counter expectorant like guaifenesin to thin secretions, make coughs more productive and relieve congestion  -Follow up with your primary care physician in 1-3 days       PRINCIPAL DISCHARGE DIAGNOSIS  Diagnosis: Pneumonia  Assessment and Plan of Treatment: You were admitted for treatment of community acquired Right lower lobe pneumonia which was treated with IV antibiotics , Complete treatment with Oral Ceftin 500 mg 1 tab 2x day start tomorrow AM , Last dose 6/15/21.  -You were evaluated by the primary medical team and infectious disease specialist.  -You improved after treatement with the appropriate intravenous and oral antibiotics  -Please take Cefuroxime 500mg orally two times a day with LAST DAY 06/15/2021  -Take benzonatate (Tessalon Perles) 200 mg orally every 8 hours as needed for your cough. This is a cough suppressant. -Eat well & stay well hydrated.  -Take an over the counter expectorant like guaifenesin to thin secretions, make coughs more productive and relieve congestion  -Follow up with your primary care physician in 1-3 days  -Repeat CXR after 8 weeks for resolution of Pneumonia      SECONDARY DISCHARGE DIAGNOSES  Diagnosis: Anemia  Assessment and Plan of Treatment: Mild Anemia 2/2 AC illness with Pneumonia   Repeat CBC in 1week with PMD

## 2021-06-12 NOTE — PROGRESS NOTE ADULT - PROBLEM SELECTOR PLAN 1
- Patient with fever and cough x5d, prescribed azithromycin and Ceftin however persistently symptomatic with fever & cough   - CXR reveals infiltrate in the superior segment of the right lower lobe with smaller infiltrate inferiorly  - CT chest noncon shows RLL PNA  - Continue with IV Zosyn (day 2) and PO azithromycin (2 more days) for atypical coverage  - Febrile overnight, Tmax 100.6F, continue with Tylenol 650mg PO q6h PRN for fever  - Albuterol HFA TID and guaifenesin PRN  - F/u blood and urine cultures  - F/u MRSA PCR, legionella urine Ag, and strep urine Ag  - Leukocytosis absent at this time, monitor daily CBC  - ID (Dr. Szymanski) consulted, recs noted and appreciated - Patient with fever and cough x5d, prescribed azithromycin and Ceftin however persistently symptomatic with fever & cough   - CXR reveals infiltrate in the superior segment of the right lower lobe with smaller infiltrate inferiorly  - CT chest noncon shows RLL PNA  - Continue with IV Zosyn (day 2) and PO azithromycin (2 more days); ID recs continue PO azithrymycin, start IV Rocephin  - Febrile overnight, Tmax 100.6F, continue with Tylenol 650mg PO q6h PRN for fever  - Albuterol HFA TID and guaifenesin PRN  - F/u blood and urine cultures  - F/u MRSA PCR, legionella urine Ag, and strep urine Ag  - Leukocytosis absent at this time, monitor daily CBC  - ID (Dr. Szymanski) consulted, recs noted and appreciated - Patient with fever and cough x5d, prescribed azithromycin and Ceftin however persistently symptomatic with fever & cough   - CXR reveals infiltrate in the superior segment of the right lower lobe with smaller infiltrate inferiorly  - CT chest non con shows RLL PNA  - Continue with IV Zosyn (day 2) and PO azithromycin (2 more days); ID recs continue PO azithromycin, start IV Rocephin  - Febrile overnight, Tmax 100.6F, continue with Tylenol 650mg PO q6h PRN for fever  - Albuterol HFA TID and guaifenesin PRN  - F/u blood and urine cultures  - F/u MRSA PCR, legionella urine Ag, and strep urine Ag  - Leukocytosis absent at this time, monitor daily CBC  - ID (Dr. Szymanski) consulted, ---> Change to IV Rocephin daily

## 2021-06-12 NOTE — DISCHARGE NOTE PROVIDER - NSDCMRMEDTOKEN_GEN_ALL_CORE_FT
benzonatate 100 mg oral capsule: 2 cap(s) orally 3 times a day, As needed, Cough  guaiFENesin 100 mg/5 mL oral liquid: 10 milliliter(s) orally every 6 hours, As needed, Cough   albuterol 90 mcg/inh inhalation aerosol: 2 puff(s) inhaled 3 times a day, As Needed  benzonatate 100 mg oral capsule: 2 cap(s) orally 3 times a day, As needed, Cough  cefuroxime 500 mg oral tablet: 1 tab(s) orally every 12 hours  start tomorrow Morning  Last dose 6/15 PM   guaiFENesin 100 mg/5 mL oral liquid: 10 milliliter(s) orally every 6 hours, As needed, Cough  saccharomyces boulardii lyo 250 mg oral capsule: 1 cap(s) orally 2 times a day  Continue home Probiotics

## 2021-06-12 NOTE — PROGRESS NOTE ADULT - PROBLEM SELECTOR PLAN 2
- Mild hypokalemia present on admission, likely due to patient report of diarrhea, repleted with PO 40mEq tab - RESOLVED  - Hypocalcemia present, however when corrected for hypoalbuminemia, Ca 8.5 today, WNL  - Monitor daily electrolytes Anemia 2/2 AC PNA , ac illness ,S/P IV Fluid  CBC in AM

## 2021-06-13 ENCOUNTER — TRANSCRIPTION ENCOUNTER (OUTPATIENT)
Age: 71
End: 2021-06-13

## 2021-06-13 VITALS
RESPIRATION RATE: 17 BRPM | DIASTOLIC BLOOD PRESSURE: 65 MMHG | HEART RATE: 83 BPM | SYSTOLIC BLOOD PRESSURE: 109 MMHG | TEMPERATURE: 98 F | OXYGEN SATURATION: 95 %

## 2021-06-13 LAB
ALBUMIN SERPL ELPH-MCNC: 2.8 G/DL — LOW (ref 3.3–5)
ALP SERPL-CCNC: 41 U/L — SIGNIFICANT CHANGE UP (ref 40–120)
ALT FLD-CCNC: 65 U/L — SIGNIFICANT CHANGE UP (ref 12–78)
ANION GAP SERPL CALC-SCNC: 7 MMOL/L — SIGNIFICANT CHANGE UP (ref 5–17)
AST SERPL-CCNC: 84 U/L — HIGH (ref 15–37)
BILIRUB SERPL-MCNC: 0.5 MG/DL — SIGNIFICANT CHANGE UP (ref 0.2–1.2)
BUN SERPL-MCNC: 9 MG/DL — SIGNIFICANT CHANGE UP (ref 7–23)
CALCIUM SERPL-MCNC: 8.1 MG/DL — LOW (ref 8.5–10.1)
CHLORIDE SERPL-SCNC: 108 MMOL/L — SIGNIFICANT CHANGE UP (ref 96–108)
CO2 SERPL-SCNC: 26 MMOL/L — SIGNIFICANT CHANGE UP (ref 22–31)
CREAT SERPL-MCNC: 0.56 MG/DL — SIGNIFICANT CHANGE UP (ref 0.5–1.3)
CULTURE RESULTS: NO GROWTH — SIGNIFICANT CHANGE UP
CULTURE RESULTS: SIGNIFICANT CHANGE UP
GLUCOSE SERPL-MCNC: 100 MG/DL — HIGH (ref 70–99)
HCT VFR BLD CALC: 31.3 % — LOW (ref 34.5–45)
HGB BLD-MCNC: 10.6 G/DL — LOW (ref 11.5–15.5)
MAGNESIUM SERPL-MCNC: 2 MG/DL — SIGNIFICANT CHANGE UP (ref 1.6–2.6)
MCHC RBC-ENTMCNC: 31.6 PG — SIGNIFICANT CHANGE UP (ref 27–34)
MCHC RBC-ENTMCNC: 33.9 GM/DL — SIGNIFICANT CHANGE UP (ref 32–36)
MCV RBC AUTO: 93.4 FL — SIGNIFICANT CHANGE UP (ref 80–100)
NRBC # BLD: 0 /100 WBCS — SIGNIFICANT CHANGE UP (ref 0–0)
PHOSPHATE SERPL-MCNC: 3 MG/DL — SIGNIFICANT CHANGE UP (ref 2.5–4.5)
PLATELET # BLD AUTO: 213 K/UL — SIGNIFICANT CHANGE UP (ref 150–400)
POTASSIUM SERPL-MCNC: 3.9 MMOL/L — SIGNIFICANT CHANGE UP (ref 3.5–5.3)
POTASSIUM SERPL-SCNC: 3.9 MMOL/L — SIGNIFICANT CHANGE UP (ref 3.5–5.3)
PROT SERPL-MCNC: 5.9 G/DL — LOW (ref 6–8.3)
RBC # BLD: 3.35 M/UL — LOW (ref 3.8–5.2)
RBC # FLD: 11.5 % — SIGNIFICANT CHANGE UP (ref 10.3–14.5)
SODIUM SERPL-SCNC: 141 MMOL/L — SIGNIFICANT CHANGE UP (ref 135–145)
SPECIMEN SOURCE: SIGNIFICANT CHANGE UP
SPECIMEN SOURCE: SIGNIFICANT CHANGE UP
WBC # BLD: 5.75 K/UL — SIGNIFICANT CHANGE UP (ref 3.8–10.5)
WBC # FLD AUTO: 5.75 K/UL — SIGNIFICANT CHANGE UP (ref 3.8–10.5)

## 2021-06-13 PROCEDURE — 86803 HEPATITIS C AB TEST: CPT

## 2021-06-13 PROCEDURE — 96367 TX/PROPH/DG ADDL SEQ IV INF: CPT

## 2021-06-13 PROCEDURE — 94640 AIRWAY INHALATION TREATMENT: CPT

## 2021-06-13 PROCEDURE — 81001 URINALYSIS AUTO W/SCOPE: CPT

## 2021-06-13 PROCEDURE — 85730 THROMBOPLASTIN TIME PARTIAL: CPT

## 2021-06-13 PROCEDURE — 86769 SARS-COV-2 COVID-19 ANTIBODY: CPT

## 2021-06-13 PROCEDURE — 85610 PROTHROMBIN TIME: CPT

## 2021-06-13 PROCEDURE — 87507 IADNA-DNA/RNA PROBE TQ 12-25: CPT

## 2021-06-13 PROCEDURE — 83735 ASSAY OF MAGNESIUM: CPT

## 2021-06-13 PROCEDURE — 85027 COMPLETE CBC AUTOMATED: CPT

## 2021-06-13 PROCEDURE — 87899 AGENT NOS ASSAY W/OPTIC: CPT

## 2021-06-13 PROCEDURE — 93005 ELECTROCARDIOGRAM TRACING: CPT

## 2021-06-13 PROCEDURE — 80053 COMPREHEN METABOLIC PANEL: CPT

## 2021-06-13 PROCEDURE — 71250 CT THORAX DX C-: CPT

## 2021-06-13 PROCEDURE — 85025 COMPLETE CBC W/AUTO DIFF WBC: CPT

## 2021-06-13 PROCEDURE — 87086 URINE CULTURE/COLONY COUNT: CPT

## 2021-06-13 PROCEDURE — 84100 ASSAY OF PHOSPHORUS: CPT

## 2021-06-13 PROCEDURE — 87040 BLOOD CULTURE FOR BACTERIA: CPT

## 2021-06-13 PROCEDURE — 36415 COLL VENOUS BLD VENIPUNCTURE: CPT

## 2021-06-13 PROCEDURE — 99285 EMERGENCY DEPT VISIT HI MDM: CPT | Mod: 25

## 2021-06-13 PROCEDURE — 71046 X-RAY EXAM CHEST 2 VIEWS: CPT

## 2021-06-13 PROCEDURE — 96365 THER/PROPH/DIAG IV INF INIT: CPT

## 2021-06-13 PROCEDURE — 0225U NFCT DS DNA&RNA 21 SARSCOV2: CPT

## 2021-06-13 PROCEDURE — 87449 NOS EACH ORGANISM AG IA: CPT

## 2021-06-13 PROCEDURE — 83605 ASSAY OF LACTIC ACID: CPT

## 2021-06-13 RX ORDER — CEFTRIAXONE 500 MG/1
1000 INJECTION, POWDER, FOR SOLUTION INTRAMUSCULAR; INTRAVENOUS ONCE
Refills: 0 | Status: COMPLETED | OUTPATIENT
Start: 2021-06-13 | End: 2021-06-13

## 2021-06-13 RX ORDER — CEFUROXIME AXETIL 250 MG
500 TABLET ORAL EVERY 12 HOURS
Refills: 0 | Status: DISCONTINUED | OUTPATIENT
Start: 2021-06-14 | End: 2021-06-13

## 2021-06-13 RX ORDER — CEFUROXIME AXETIL 250 MG
1 TABLET ORAL
Qty: 6 | Refills: 0
Start: 2021-06-13 | End: 2021-06-15

## 2021-06-13 RX ORDER — SACCHAROMYCES BOULARDII 250 MG
1 POWDER IN PACKET (EA) ORAL
Qty: 0 | Refills: 0 | DISCHARGE
Start: 2021-06-13

## 2021-06-13 RX ORDER — ALBUTEROL 90 UG/1
2 AEROSOL, METERED ORAL
Qty: 0 | Refills: 0 | DISCHARGE
Start: 2021-06-13

## 2021-06-13 RX ORDER — AZITHROMYCIN 500 MG/1
500 TABLET, FILM COATED ORAL ONCE
Refills: 0 | Status: COMPLETED | OUTPATIENT
Start: 2021-06-13 | End: 2021-06-13

## 2021-06-13 RX ORDER — CEFUROXIME AXETIL 250 MG
1 TABLET ORAL
Qty: 0 | Refills: 0 | DISCHARGE
Start: 2021-06-13

## 2021-06-13 RX ADMIN — Medication 200 MILLIGRAM(S): at 02:17

## 2021-06-13 RX ADMIN — ENOXAPARIN SODIUM 40 MILLIGRAM(S): 100 INJECTION SUBCUTANEOUS at 11:19

## 2021-06-13 RX ADMIN — Medication 200 MILLIGRAM(S): at 09:27

## 2021-06-13 RX ADMIN — ALBUTEROL 2 PUFF(S): 90 AEROSOL, METERED ORAL at 06:05

## 2021-06-13 RX ADMIN — Medication 250 MILLIGRAM(S): at 06:06

## 2021-06-13 RX ADMIN — AZITHROMYCIN 500 MILLIGRAM(S): 500 TABLET, FILM COATED ORAL at 11:19

## 2021-06-13 RX ADMIN — CEFTRIAXONE 100 MILLIGRAM(S): 500 INJECTION, POWDER, FOR SOLUTION INTRAMUSCULAR; INTRAVENOUS at 11:19

## 2021-06-13 NOTE — PROGRESS NOTE ADULT - PROBLEM SELECTOR PLAN 1
- Patient with fever and cough x5d, prescribed azithromycin and Ceftin however persistently symptomatic with fever & cough   - CXR reveals infiltrate in the superior segment of the right lower lobe with smaller infiltrate inferiorly  - CT chest non con shows RLL PNA  - Given IV Zosyn (2 days) and PO azithromycin ->; ID recs continue PO azithromycin until 06/13, started IV Rocephin  - Tmax 100.6F inpatient, continue with Tylenol 650mg PO q6h PRN for fever  - Albuterol HFA TID and guaifenesin PRN  - blood and urine cultures NGTD prelim  - Legionella ag negative.  - Follow up MRSA PCR, strep urine Ag, GI PCR  - Leukocytosis absent at this time, monitor daily CBC  - ID (Dr. Szymanski) recs appreciated - Patient with fever and cough x5d, prescribed azithromycin and Ceftin however persistently symptomatic with fever & cough   - CXR reveals infiltrate in the superior segment of the right lower lobe with smaller infiltrate inferiorly  - CT chest non con shows RLL PNA  - Given IV Zosyn (2 days) and PO azithromycin ->; ID recs continue PO azithromycin until 06/13, started IV Rocephin  - Tmax 100.6F inpatient, continue with Tylenol 650mg PO q6h PRN for fever  - Albuterol HFA TID and guaifenesin PRN  - blood and urine cultures NGTD prelim  - RVP neg Legionella ag negative.  - Follow up MRSA PCR, strep urine Ag, GI PCR  - Leukocytosis absent at this time, monitor daily CBC  - ID (Dr. Szymanski) recs appreciated - Patient with fever and cough x5d, prescribed azithromycin and Ceftin however persistently symptomatic with fever & cough   - CXR reveals infiltrate in the superior segment of the right lower lobe with smaller infiltrate inferiorly  - CT chest non con shows RLL PNA  - Given IV Zosyn (2 days) and PO azithromycin ->; ID recs continue PO azithromycin until 06/13, started IV Rocephin  - DC on ceftin 500 PO BID until 06/15  - Tmax 100.6F inpatient, continue with Tylenol 650mg PO q6h PRN for fever  - Albuterol HFA TID and guaifenesin PRN  - blood and urine cultures NGTD prelim  - RVP neg Legionella ag negative.  - Leukocytosis absent at this time, monitor daily CBC  - ID (Dr. Szymanski) recs appreciated - Patient with fever and cough x5d, prescribed azithromycin and Ceftin however persistently symptomatic with fever & cough   - CXR reveals infiltrate in the superior segment of the right lower lobe with smaller infiltrate inferiorly  - CT chest non con shows RLL PNA  - Given IV Zosyn (2 days) and PO azithromycin ->; ID recs continue PO azithromycin until 06/13, started IV Rocephin  - DC on ceftin 500 PO BID until 06/15  - Tmax 100.6F inpatient, continue with Tylenol 650mg PO q6h PRN for fever  - Albuterol HFA TID and guaifenesin PRN  - blood and urine cultures NGTD prelim  - RVP neg Legionella ag negative.  - Leukocytosis absent at this time, monitor daily CBC  - ID (Dr. Szymanski) d/w -stable for d/c home today

## 2021-06-13 NOTE — PROGRESS NOTE ADULT - PROBLEM SELECTOR PLAN 2
Anemia 2/2 AC PNA , ac illness ,S/P IV Fluid  -Follow CBC Anemia 2/2 AC PNA , ac illness ,S/P IV Fluid & Daily Labs   -Follow CBC as out pt

## 2021-06-13 NOTE — PROGRESS NOTE ADULT - SUBJECTIVE AND OBJECTIVE BOX
[facility] DIVISION of INFECTIOUS DISEASE  Reji Szymanski MD PhD, Cyndie Patel MD, Farheen Marcial MD, Nathaniel Tesfaye MD  and providing coverage with Pratibha Enriquez MD and Audelia Peters MD  Providing Infectious Disease Consultations at [facility], [facility], [facility]    Office# [phone] to schedule follow up appointments  Answering Service for urgent calls or New Consults [phone]  Cell# to text for urgent issues Reji Szymanski [phone]     infectious diseases progress note:    LISA CHENEY is a 71y y. o. Female patient    No concerning overnight events, pt reports she is feeling better and ready to go home    Allergies    No Known Allergies    Intolerances        ANTIBIOTICS/RELEVANT:  antimicrobials  azithromycin   Tablet 500 milliGRAM(s) Oral daily  cefTRIAXone   IVPB 1000 milliGRAM(s) IV Intermittent every 24 hours    immunologic:    OTHER:  acetaminophen   Tablet .. 650 milliGRAM(s) Oral every 6 hours PRN  ALBUTerol    90 MICROgram(s) HFA Inhaler 2 Puff(s) Inhalation three times a day  benzonatate 200 milliGRAM(s) Oral three times a day PRN  enoxaparin Injectable 40 milliGRAM(s) SubCutaneous daily  guaiFENesin Oral Liquid (Sugar-Free) 200 milliGRAM(s) Oral every 6 hours PRN  saccharomyces boulardii 250 milliGRAM(s) Oral two times a day  sodium chloride 0.9%. 1000 milliLiter(s) IV Continuous <Continuous>      Objective:  Vital Signs Last 24 Hrs  T(C): 37.3 (2021 05:14), Max: 37.3 (2021 05:14)  T(F): 99.2 (2021 05:14), Max: 99.2 (2021 05:14)  HR: 67 (2021 05:14) (67 - 84)  BP: 118/72 (2021 05:14) (106/68 - 118/72)  BP(mean): --  RR: 18 (2021 05:14) (16 - 18)  SpO2: 91% (2021 05:14) (91% - 95%)    T(C): 37.3 (21 @ 05:14), Max: 38.1 (21 @ 19:20)  T(C): 37.3 (21 @ 05:14), Max: 38.1 (21 @ 19:20)  T(C): 37.3 (21 @ 05:14), Max: 38.1 (21 @ 19:20)    PHYSICAL EXAM:  HEENT: NC atraumatic  Neck: supple  Respiratory: no accessory muscle use, breathing comfortably  Cardiovascular: distant  Gastrointestinal: normal appearing, nondistended  Extremities: no clubbing, no cyanosis,        LABS:                          10.6   5.75  )-----------( 213      ( 2021 06:05 )             31.3       5.75  @ 06:05  4.91 12 @ 06:54  6.20  @ 12:41      06    141  |  108  |  9   ----------------------------<  100<H>  3.9   |  26  |  0.56    Ca    8.1<L>      2021 06:05  Phos  3.0       Mg     2.0         TPro  5.9<L>  /  Alb  2.8<L>  /  TBili  0.5  /  DBili  x   /  AST  84<H>  /  ALT  65  /  AlkPhos  41  13      Creatinine, Serum: 0.56 mg/dL (21 @ 06:05)  Creatinine, Serum: 0.51 mg/dL (21 @ 06:54)  Creatinine, Serum: 0.73 mg/dL (21 @ 12:41)      PT/INR - ( 2021 12:41 )   PT: 13.1 sec;   INR: 1.13 ratio         PTT - ( 2021 12:41 )  PTT:28.7 sec  Urinalysis Basic - ( 2021 16:58 )    Color: Pale Yellow / Appearance: Clear / S.005 / pH: x  Gluc: x / Ketone: Negative  / Bili: Negative / Urobili: Negative   Blood: x / Protein: Negative / Nitrite: Negative   Leuk Esterase: Negative / RBC: 0-2 /HPF / WBC Negative   Sq Epi: x / Non Sq Epi: Occasional / Bacteria: Occasional            INFLAMMATORY MARKERS  Auto Neutrophil #: 4.24 K/uL (21 @ 12:41)  Auto Lymphocyte #: 1.06 K/uL (21 @ 12:41)    Lactate, Blood: 1.0 mmol/L (21 @ 12:41)    Auto Eosinophil #: 0.05 K/uL (21 @ 12:41)                        Activated Partial Thromboplastin Time: 28.7 sec (21 @ 12:41)  INR: 1.13 ratio (21 @ 12:41)          MICROBIOLOGY:              RADIOLOGY & ADDITIONAL STUDIES:

## 2021-06-13 NOTE — PROGRESS NOTE ADULT - ASSESSMENT
72yo F, with no significant PMH, presenting from home with complaint of cough, fever, and noted to have evidence of RLL airspace disease on exam and imaging    RECOMMENDATIONS    Story is compelling for Community Acquired PNA, RVP is negative and sent off additional testing to help guide therapy including MRSA nares PCR, urine for legionella and sputum, recommend standard CAP Rx with   Azithromycin/Ceftriaxone last dose today then fine for dc on    Cefuroxime 500mg PO BID with last day 6/15    From an ID standpoint no further requirement for inpatient status for the management of ID issues after last dose of IV Ceftriaxone and po Azithro. Fine with discharge from ID standpoint when other medical issues no longer require inpatient care and social issues allow for a safe discharge plan. To schedule an outpatient ID follow up appointment please call our office at [phone]    Thank you for consulting us and involving us in the management of this most interesting and challenging case.  Please call us at [phone] or text me directly on my cell#[phone] with any concerns or further questions.           72yo F, with no significant PMH, presenting from home with complaint of cough, fever, and noted to have evidence of RLL airspace disease on exam and imaging    RECOMMENDATIONS    Story is compelling for Community Acquired PNA, RVP is negative   recommend standard CAP Rx with   Azithromycin/Ceftriaxone last dose today then fine for dc on    Cefuroxime 500mg PO BID with last day 6/15    From an ID standpoint no further requirement for inpatient status for the management of ID issues after last dose of IV Ceftriaxone and po Azithro. Fine with discharge from ID standpoint when other medical issues no longer require inpatient care and social issues allow for a safe discharge plan. To schedule an outpatient ID follow up appointment please call our office at [phone]    Thank you for consulting us and involving us in the management of this most interesting and challenging case.  Please call us at [phone] or text me directly on my cell#[phone] with any concerns or further questions.

## 2021-06-13 NOTE — PROGRESS NOTE ADULT - PROBLEM SELECTOR PLAN 4
VTE ppx with daily Lovenox 40mg subQ daily    IMPROVE VTE Individual Risk Assessment          RISK                                                          Points  [  ] Previous VTE                                                3  [  ] Thrombophilia                                             2  [  ] Lower limb paralysis                                   2        (unable to hold up >15 seconds)    [  ] Current Cancer                                             2         (within 6 months)  [  ] Immobilization > 24 hrs                              1  [  ] ICU/CCU stay > 24 hours                             1  [ x ] Age > 60                                                         1    IMPROVE VTE Score: 1
VTE ppx with daily Lovenox 40mg subQ daily    IMPROVE VTE Individual Risk Assessment          RISK                                                          Points  [  ] Previous VTE                                                3  [  ] Thrombophilia                                             2  [  ] Lower limb paralysis                                   2        (unable to hold up >15 seconds)    [  ] Current Cancer                                             2         (within 6 months)  [  ] Immobilization > 24 hrs                              1  [  ] ICU/CCU stay > 24 hours                             1  [ x ] Age > 60                                                         1    IMPROVE VTE Score: 1

## 2021-06-13 NOTE — PROGRESS NOTE ADULT - PROBLEM SELECTOR PLAN 3
- Mild hypokalemia present on admission, likely due to patient report of diarrhea, repleted with PO 40mEq tab - RESOLVED  - Corrected calcium, WNL  - Monitor daily electrolytes

## 2021-06-13 NOTE — PROGRESS NOTE ADULT - ASSESSMENT
70yo F, with no significant PMH, presenting from home with complaint of cough and fever, admitted for CAP that has failed outpatient abx.

## 2021-06-13 NOTE — PROGRESS NOTE ADULT - ATTENDING COMMENTS
72yo F, with no significant PMH, presenting from home with complaint of cough and fever, admitted for RT LL CAP that has failed outpatient abx.  Pt seen, examined, case & care plan d/w pt, residents at detail.  AM labs   PO diet   ID Dr Szymanski D/W ---> Change to IV Rocephin 1 gm daily  Total care time is 40 minutes.
70yo F, with no significant PMH, presenting from home with complaint of cough and fever, admitted for RT LL CAP that has failed outpatient abx.  Pt seen, examined, case & care plan d/w pt, residents at detail.  PO diet   ID Dr Szymanski D/W ---> stable for d/c , Change to PO Ceftin 500 mg 2x day, Last dose 6/15.  Total d/c  care time is 50 minutes.   Case d/w DR Duron -PMD at detail.

## 2021-06-13 NOTE — PROGRESS NOTE ADULT - SUBJECTIVE AND OBJECTIVE BOX
Patient is a 71y old  Female who presents with a chief complaint of PNA (2021 10:04)    HPI:  72yo F, with no significant PMH, presenting from home with complaint of cough and fever. Patient felt ill on Saturday, stating she had a headache. She awoke feeling well again on , however by Monday, she had developed a fever of 101 deg. She initially presented to an urgent care center that day and was told her symptoms were likely viral in nature. Symptoms persisted, and she visited her PCP Dr. Duron on Wednesday, at which time patient was diagnosed with pneumonia by CXR and was prescribed azithromycin and Ceftin. Fever has continued with Tmax 103 deg this AM, which has responded to Tylenol, last taken at 7:30 AM. Currently, patient endorses nonproductive cough. She notes she had liquidy diarrhea for the past few days but has not had loose BMs today at all. She also states her appetite has decreased over the last few days but at time of my encounter, she feels hungry. Otherwise denies vision/hearing changes, chest pain, palpitations, dyspnea, abd pain, N/V, urinary symptoms, skin changes, or new myalgia/arthralgia.    In the ED  VS: T 97 HR 74 /74 RR 16 SpO2 97% on room air  Significant labs include K 3.4, Ca 7.9 (corrected 8.7), alb 3.0, AST 53.  CXR: infiltrate of RLL superior segment  CT chest: RLL PNA  EKG: NSR with no concerning ST segment/Tw abnormalities    Patient received 1950cc NS, 1x Zosyn, and 1x azithromycin. (2021 14:46)    INTERVAL HPI:  : Patient seen and examined at bedside. Patient admitted yesterday for CAP after failing outpatient treatment with PO Ceftin and azithromycin. Now on IV Zosyn, PO azithromycin. ID recs IV Rocephin, PO azithromycin. Patient reports feeling some improvement when compared with yesterday though still with persistent nonproductive cough and some diarrhea and chills. No SOB or chest pain. Mild Anemia Low H/H  : DC today on PO ceftin. PT already has adequate supply at home. No new issues or complaints. Sent Rx for Tessalon perles.      OVERNIGHT EVENTS:    Home Medications:  guaiFENesin 100 mg/5 mL oral liquid: 10 milliliter(s) orally every 6 hours, As needed, Cough (2021 12:56)      MEDICATIONS  (STANDING):  ALBUTerol    90 MICROgram(s) HFA Inhaler 2 Puff(s) Inhalation three times a day  enoxaparin Injectable 40 milliGRAM(s) SubCutaneous daily  saccharomyces boulardii 250 milliGRAM(s) Oral two times a day  sodium chloride 0.9%. 1000 milliLiter(s) (50 mL/Hr) IV Continuous <Continuous>    MEDICATIONS  (PRN):  acetaminophen   Tablet .. 650 milliGRAM(s) Oral every 6 hours PRN Temp greater or equal to 38C (100.4F)  benzonatate 200 milliGRAM(s) Oral three times a day PRN Cough  guaiFENesin Oral Liquid (Sugar-Free) 200 milliGRAM(s) Oral every 6 hours PRN Cough      Allergies    No Known Allergies    Intolerances        Social History:  Social History:    Marital Status:  (   )    (   ) Single    (   )    ( x )   Occupation:   Lives with: ( x ) alone  (  ) children   (  ) spouse   (  ) parents  (  ) other    Substance Use (street drugs): ( x ) never used  (  ) other:  Tobacco Usage:  (  x ) never smoked   (   ) former smoker   (   ) current smoker  (     ) pack year  (        ) last cigarette date  Alcohol Usage: never    Immunization Hx:     (  ) pneumonia shot               (     ) date  (  ) COVID, Moderna /                           ( 3/2021 ) date (2021 14:46)      REVIEW OF SYSTEMS :feels better, tired  CONSTITUTIONAL: [ x ] Fever, No chills, No fatigue, No myalgia, No Body ache, + Weakness  EYES: No eye pain,  No visual disturbances, No discharge, No Redness  ENMT: No ear pain, No nose bleed, No vertigo; No sinus or throat pain, No Congestion  NECK: No pain, No stiffness  RESPIRATORY: [ x ] cough, No wheezing, No hemoptysis, No shortness of breath  CARDIOVASCULAR: No chest pain, palpitations  GASTROINTESTINAL: [ x ] nonbloody diarrhea. No abdominal or epigastric pain. No nausea, No vomiting or constipation; [  ] BM  GENITOURINARY: No dysuria, No frequency, No urgency, No hematuria or incontinence  NEUROLOGICAL: No headaches, No dizziness, No numbness, No tingling, No tremors, No weakness  EXT: No Swelling, No Pain, No Edema  SKIN: [ x ] No itching, burning, rashes, or lesions   MUSCULOSKELETAL: No joint pain or swelling; No muscle pain, No back pain, No extremity pain  PSYCHIATRIC: No depression, anxiety, mood swings or difficulty sleeping at night  PAIN SCALE: [ x ] None  [  ] Other-  ROS Unable to obtain due to: [  ] Dementia  [  ] Lethargy  [  ] Sedated  [  ]  non verbal  REST OF REVIEW OF SYSTEMS: [ x ] Normal     Vital Signs Last 24 Hrs  T(C): 37.3 (2021 05:14), Max: 37.3 (2021 05:14)  T(F): 99.2 (2021 05:14), Max: 99.2 (2021 05:14)  HR: 67 (2021 05:14) (67 - 73)  BP: 118/72 (2021 05:14) (114/70 - 118/72)  BP(mean): --  RR: 18 (2021 05:14) (18 - 18)  SpO2: 91% (2021 05:14) (91% - 95%)    I&O's Summary    2021 07:01  -  2021 07:00  --------------------------------------------------------  IN: 550 mL / OUT: 0 mL / NET: 550 mL      PHYSICAL EXAM:  GENERAL:  [ x ] NAD, [ x ] Well appearing, [  ] Agitated, [  ] Drowsy, [  ] Lethargy, [  ] Confused   HEAD:  [ x ] Normal, [  ] Other  EYES:  [ x ] EOMI, [ x ] PERRLA, [ x ] Conjunctiva and sclera clear normal, [  ] Other, [  ] Pallor, [  ] Discharge  ENMT:  [ x ] Normal, [ x ] Moist mucous membranes, [ x ] Good dentition, [x ] No thrush  NECK:  [ x ] Supple, [ x ] No JVD, [ x ] Normal thyroid, [  ] Lymphadenopathy, [  ] Other  CHEST/LUNG:  [  ] Clear to auscultation bilaterally, [x  ] Breath Sounds decrease Rt LL, [ x ] RLL RALES, [ x ] + rhonchi, [x  ] No wheezing  HEART:  [ x ] Regular rate and rhythm, [  ] Tachycardia, [  ] Bradycardia, [  ] Irregular, [ x ] No murmurs, No rubs, No gallops, [  ] PPM in place (Mfr:  )  ABDOMEN:  [ x ] Soft, [ x ] Nontender, [ x ] Nondistended, [ x ] No mass, [ x ] Bowel sounds present, [  ] Obese  NERVOUS SYSTEM:  [ x ] Alert & Oriented x3, [ x ] Nonfocal, [  ] Confusion, [  ] Encephalopathic, [  ] Sedated, [  ] Unable to assess, [  ] Other-  EXTREMITIES:  [ x ] 2+ Peripheral Pulses, No clubbing, No cyanosis,  [  ] edema B/L lower EXT, [  ] PVD stasis skin changes B/L lower EXT  LYMPH:  No lymphadenopathy noted  SKIN:  [ x ] No rashes or lesions, [  ] Pressure ulcers, [  ] Ecchymosis, [  ] Skin tears, [  ] Other    DIET: Diet, Regular (21 @ 16:04)      LABS:                        10.6   5.75  )-----------( 213      ( 2021 06:05 )             31.3     2021 06:05    141    |  108    |  9      ----------------------------<  100    3.9     |  26     |  0.56     Ca    8.1        2021 06:05  Phos  3.0       2021 06:05  Mg     2.0       2021 06:05    TPro  5.9    /  Alb  2.8    /  TBili  0.5    /  DBili  x      /  AST  84     /  ALT  65     /  AlkPhos  41     2021 06:05      Urinalysis Basic - ( 2021 16:58 )    Color: Pale Yellow / Appearance: Clear / S.005 / pH: x  Gluc: x / Ketone: Negative  / Bili: Negative / Urobili: Negative   Blood: x / Protein: Negative / Nitrite: Negative   Leuk Esterase: Negative / RBC: 0-2 /HPF / WBC Negative   Sq Epi: x / Non Sq Epi: Occasional / Bacteria: Occasional        Culture Results:   No growth ( @ 21:21)  Culture Results:   No growth to date. ( @ 18:57)  Culture Results:   No growth to date. ( @ 18:57)      culture blood  -- .Urine Clean Catch (Midstream)  @ 21:21    culture urine  --   @ 21:21  culture blood  -- .Blood Blood-Peripheral  @ 18:57    culture urine  --   @ 18:57              Culture - Urine (collected 2021 21:21)  Source: .Urine Clean Catch (Midstream)  Final Report (2021 00:24):    No growth    Culture - Blood (collected 2021 18:57)  Source: .Blood Blood-Peripheral  Preliminary Report (2021 19:02):    No growth to date.    Culture - Blood (collected 2021 18:57)  Source: .Blood Blood-Peripheral  Preliminary Report (2021 19:02):    No growth to date.         Anemia Panel:      Thyroid Panel:                RADIOLOGY & ADDITIONAL TESTS:      HEALTH ISSUES - PROBLEM Dx:  Community acquired pneumonia    Need for prophylactic measure    Electrolyte disturbance    Anemia            Consultant(s) Notes Reviewed:  [ x ] YES     Care Discussed with [ x ] Consultants, [ x ] Patient, [ x ] Family, [  ] , [  ] Social Service, [ x ] RN, [  ] Physical Therapy  DVT PPX: [ x ] Lovenox, [  ] S C Heparin, [  ] Coumadin, [  ] Xarelto, [  ] Eliquis, [  ] Pradaxa, [  ] IV Heparin drip, [  ] SCD, [  ] Contraindication 2 to GI Bleed, [  ] Ambulation  Advanced Directive: [x  ] None, [  ] DNR/DNI Patient is a 71y old  Female who presents with a chief complaint of PNA (2021 10:04)    HPI:  70yo F, with no significant PMH, presenting from home with complaint of cough and fever. Patient felt ill on Saturday, stating she had a headache. She awoke feeling well again on , however by Monday, she had developed a fever of 101 deg. She initially presented to an urgent care center that day and was told her symptoms were likely viral in nature. Symptoms persisted, and she visited her PCP Dr. Duron on Wednesday, at which time patient was diagnosed with pneumonia by CXR and was prescribed azithromycin and Ceftin. Fever has continued with Tmax 103 deg this AM, which has responded to Tylenol, last taken at 7:30 AM. Currently, patient endorses nonproductive cough. She notes she had liquidy diarrhea for the past few days but has not had loose BMs today at all. She also states her appetite has decreased over the last few days but at time of my encounter, she feels hungry. Otherwise denies vision/hearing changes, chest pain, palpitations, dyspnea, abd pain, N/V, urinary symptoms, skin changes, or new myalgia/arthralgia.    In the ED  VS: T 97 HR 74 /74 RR 16 SpO2 97% on room air  Significant labs include K 3.4, Ca 7.9 (corrected 8.7), alb 3.0, AST 53.  CXR: infiltrate of RLL superior segment  CT chest: RLL PNA  EKG: NSR with no concerning ST segment/Tw abnormalities    Patient received 1950cc NS, 1x Zosyn, and 1x azithromycin. (2021 14:46)    INTERVAL HPI:  : Patient seen and examined at bedside. Patient admitted yesterday for CAP after failing outpatient treatment with PO Ceftin and azithromycin. Now on IV Zosyn, PO azithromycin. ID recs IV Rocephin, PO azithromycin. Patient reports feeling some improvement when compared with yesterday though still with persistent nonproductive cough and some diarrhea and chills. No SOB or chest pain. Mild Anemia Low H/H  : DC today on PO ceftin. PT already has adequate supply at home. No new issues or complaints. Sent Rx for Tessalon perles. Low stable H/H.      OVERNIGHT EVENTS: NONE    Home Medications:  guaiFENesin 100 mg/5 mL oral liquid: 10 milliliter(s) orally every 6 hours, As needed, Cough (2021 12:56)      MEDICATIONS  (STANDING):  ALBUTerol    90 MICROgram(s) HFA Inhaler 2 Puff(s) Inhalation three times a day  enoxaparin Injectable 40 milliGRAM(s) SubCutaneous daily  saccharomyces boulardii 250 milliGRAM(s) Oral two times a day  sodium chloride 0.9%. 1000 milliLiter(s) (50 mL/Hr) IV Continuous <Continuous>    MEDICATIONS  (PRN):  acetaminophen   Tablet .. 650 milliGRAM(s) Oral every 6 hours PRN Temp greater or equal to 38C (100.4F)  benzonatate 200 milliGRAM(s) Oral three times a day PRN Cough  guaiFENesin Oral Liquid (Sugar-Free) 200 milliGRAM(s) Oral every 6 hours PRN Cough      Allergies    No Known Allergies    Intolerances        Social History:  Social History:    Marital Status:  (   )    (   ) Single    (   )    ( x )   Occupation:   Lives with: ( x ) alone  (  ) children   (  ) spouse   (  ) parents  (  ) other    Substance Use (street drugs): ( x ) never used  (  ) other:  Tobacco Usage:  (  x ) never smoked   (   ) former smoker   (   ) current smoker  (     ) pack year  (        ) last cigarette date  Alcohol Usage: never    Immunization Hx:     (  ) pneumonia shot               (     ) date  (  ) COVID, Moderna                            ( 3/2021 ) date (2021 14:46)      REVIEW OF SYSTEMS :feels better, tired  CONSTITUTIONAL: [  ] NO  Fever, No chills, No fatigue, No myalgia, No Body ache, + Weakness---Improving   EYES: No eye pain,  No visual disturbances, No discharge, No Redness  ENMT: No ear pain, No nose bleed, No vertigo; No sinus or throat pain, No Congestion  NECK: No pain, No stiffness  RESPIRATORY: [ x ] cough, No wheezing, No hemoptysis, No shortness of breath  CARDIOVASCULAR: No chest pain, palpitations  GASTROINTESTINAL: [ x ] nonbloody diarrhea. No abdominal or epigastric pain. No nausea, No vomiting or constipation; [  ] BM  GENITOURINARY: No dysuria, No frequency, No urgency, No hematuria or incontinence  NEUROLOGICAL: No headaches, No dizziness, No numbness, No tingling, No tremors, No weakness  EXT: No Swelling, No Pain, No Edema  SKIN: [ x ] No itching, burning, rashes, or lesions   MUSCULOSKELETAL: No joint pain or swelling; No muscle pain, No back pain, No extremity pain  PSYCHIATRIC: No depression, anxiety, mood swings or difficulty sleeping at night  PAIN SCALE: [ x ] None  [  ] Other-  ROS Unable to obtain due to: [  ] Dementia  [  ] Lethargy  [  ] Sedated  [  ]  non verbal  REST OF REVIEW OF SYSTEMS: [ x ] Normal     Vital Signs Last 24 Hrs  T(C): 37.3 (2021 05:14), Max: 37.3 (2021 05:14)  T(F): 99.2 (2021 05:14), Max: 99.2 (2021 05:14)  HR: 67 (2021 05:14) (67 - 73)  BP: 118/72 (2021 05:14) (114/70 - 118/72)  BP(mean): --  RR: 18 (2021 05:14) (18 - 18)  SpO2: 91% (2021 05:14) (91% - 95%)    I&O's Summary    2021 07:01  -  2021 07:00  --------------------------------------------------------  IN: 550 mL / OUT: 0 mL / NET: 550 mL      PHYSICAL EXAM:  GENERAL:  [ x ] NAD, [ x ] Well appearing, [  ] Agitated, [  ] Drowsy, [  ] Lethargy, [  ] Confused   HEAD:  [ x ] Normal, [  ] Other  EYES:  [ x ] EOMI, [ x ] PERRLA, [ x ] Conjunctiva and sclera clear normal, [  ] Other, [  ] Pallor, [  ] Discharge  ENMT:  [ x ] Normal, [ x ] Moist mucous membranes, [ x ] Good dentition, [x ] No thrush  NECK:  [ x ] Supple, [ x ] No JVD, [ x ] Normal thyroid, [  ] Lymphadenopathy, [  ] Other  CHEST/LUNG:  [  ] Clear to auscultation bilaterally, [x  ] Breath Sounds decrease Rt LL, [ x ] NO RALES, [ x ] NO rhonchi, [x  ] No wheezing  HEART:  [ x ] Regular rate and rhythm, [  ] Tachycardia, [  ] Bradycardia, [  ] Irregular, [ x ] No murmurs, No rubs, No gallops, [  ] PPM in place (Mfr:  )  ABDOMEN:  [ x ] Soft, [ x ] Nontender, [ x ] Nondistended, [ x ] No mass, [ x ] Bowel sounds present, [  ] Obese  NERVOUS SYSTEM:  [ x ] Alert & Oriented x3, [ x ] Nonfocal, [  ] Confusion, [  ] Encephalopathic, [  ] Sedated, [  ] Unable to assess, [  ] Other-  EXTREMITIES:  [ x ] 2+ Peripheral Pulses, No clubbing, No cyanosis,  [  ] edema B/L lower EXT, [  ] PVD stasis skin changes B/L lower EXT  LYMPH:  No lymphadenopathy noted  SKIN:  [ x ] No rashes or lesions, [  ] Pressure ulcers, [  ] Ecchymosis, [  ] Skin tears, [  ] Other    DIET: Diet, Regular (21 @ 16:04)      LABS:                        10.6   5.75  )-----------( 213      ( 2021 06:05 )             31.3     2021 06:05    141    |  108    |  9      ----------------------------<  100    3.9     |  26     |  0.56     Ca    8.1        2021 06:05  Phos  3.0       2021 06:05  Mg     2.0       2021 06:05    TPro  5.9    /  Alb  2.8    /  TBili  0.5    /  DBili  x      /  AST  84     /  ALT  65     /  AlkPhos  41     2021 06:05      Urinalysis Basic - ( 2021 16:58 )    Color: Pale Yellow / Appearance: Clear / S.005 / pH: x  Gluc: x / Ketone: Negative  / Bili: Negative / Urobili: Negative   Blood: x / Protein: Negative / Nitrite: Negative   Leuk Esterase: Negative / RBC: 0-2 /HPF / WBC Negative   Sq Epi: x / Non Sq Epi: Occasional / Bacteria: Occasional        Culture Results:   No growth ( @ 21:21)  Culture Results:   No growth to date. ( @ 18:57)  Culture Results:   No growth to date. ( @ 18:57)      culture blood  -- .Urine Clean Catch (Midstream)  @ 21:21    culture urine  --   @ 21:21  culture blood  -- .Blood Blood-Peripheral  @ 18:57    culture urine  --   @ 18:57    Culture - Urine (collected 2021 21:21)  Source: .Urine Clean Catch (Midstream)  Final Report (2021 00:24):    No growth    Culture - Blood (collected 2021 18:57)  Source: .Blood Blood-Peripheral  Preliminary Report (2021 19:02):    No growth to date.    Culture - Blood (collected 2021 18:57)  Source: .Blood Blood-Peripheral  Preliminary Report (2021 19:02):    No growth to date.        RADIOLOGY & ADDITIONAL TESTS: NONE      HEALTH ISSUES - PROBLEM Dx:  Community acquired pneumonia    Need for prophylactic measure    Electrolyte disturbance    Anemia      Consultant(s) Notes Reviewed:  [ x ] YES     Care Discussed with [ x ] Consultants, [ x ] Patient, [ x ] Family, [  ] , [  ] Social Service, [ x ] RN, [  ] Physical Therapy  DVT PPX: [ x ] Lovenox, [  ] S C Heparin, [  ] Coumadin, [  ] Xarelto, [  ] Eliquis, [  ] Pradaxa, [  ] IV Heparin drip, [  ] SCD, [  ] Contraindication 2 to GI Bleed, [  ] Ambulation  Advanced Directive: [x  ] None, [  ] DNR/DNI

## 2021-06-14 LAB — S PNEUM AG UR QL: NEGATIVE — SIGNIFICANT CHANGE UP

## 2021-06-15 PROBLEM — Z78.9 OTHER SPECIFIED HEALTH STATUS: Chronic | Status: ACTIVE | Noted: 2021-06-11

## 2021-06-16 LAB
CULTURE RESULTS: SIGNIFICANT CHANGE UP
CULTURE RESULTS: SIGNIFICANT CHANGE UP
SPECIMEN SOURCE: SIGNIFICANT CHANGE UP
SPECIMEN SOURCE: SIGNIFICANT CHANGE UP

## 2021-06-18 ENCOUNTER — APPOINTMENT (OUTPATIENT)
Dept: INTERNAL MEDICINE | Facility: CLINIC | Age: 71
End: 2021-06-18
Payer: MEDICARE

## 2021-06-18 VITALS
HEART RATE: 74 BPM | WEIGHT: 135 LBS | SYSTOLIC BLOOD PRESSURE: 114 MMHG | BODY MASS INDEX: 25.51 KG/M2 | DIASTOLIC BLOOD PRESSURE: 75 MMHG | OXYGEN SATURATION: 96 %

## 2021-06-18 PROCEDURE — 99496 TRANSJ CARE MGMT HIGH F2F 7D: CPT

## 2021-07-02 ENCOUNTER — APPOINTMENT (OUTPATIENT)
Dept: INTERNAL MEDICINE | Facility: CLINIC | Age: 71
End: 2021-07-02
Payer: MEDICARE

## 2021-07-02 VITALS
SYSTOLIC BLOOD PRESSURE: 107 MMHG | HEART RATE: 72 BPM | OXYGEN SATURATION: 98 % | TEMPERATURE: 98.6 F | WEIGHT: 135 LBS | BODY MASS INDEX: 25.51 KG/M2 | DIASTOLIC BLOOD PRESSURE: 72 MMHG

## 2021-07-02 PROCEDURE — 99213 OFFICE O/P EST LOW 20 MIN: CPT

## 2021-07-02 NOTE — HISTORY OF PRESENT ILLNESS
[FreeTextEntry1] : ROUTINE FOLLOW UP FOR PNEUMONIA  [de-identified] : PATIENT S/P HOSPITALIZATION FOR RLL COMMUNITY ACQUIRED PNEUMONIA TREATED W/ CEFUROXIME AND ZITHROMAX. FEELS WELL , DENIES ANY COUGH , SPUTUM , FEVER , NIGHT SWEATS , DYSPNEA , CHEST PAINS .

## 2021-07-02 NOTE — REVIEW OF SYSTEMS
[Fever] : no fever [Chills] : no chills [Night Sweats] : no night sweats [Chest Pain] : no chest pain [Palpitations] : no palpitations [Orthopnea] : no orthopnea [Shortness Of Breath] : no shortness of breath [Wheezing] : no wheezing [Cough] : no cough [Abdominal Pain] : no abdominal pain [Diarrhea] : diarrhea [Vomiting] : no vomiting [Negative] : Genitourinary

## 2021-07-19 RX ORDER — AZITHROMYCIN 250 MG/1
250 TABLET, FILM COATED ORAL
Qty: 1 | Refills: 0 | Status: DISCONTINUED | COMMUNITY
Start: 2021-06-09 | End: 2021-07-19

## 2021-07-19 RX ORDER — CEFUROXIME AXETIL 500 MG/1
500 TABLET ORAL
Qty: 20 | Refills: 0 | Status: DISCONTINUED | COMMUNITY
Start: 2021-06-09 | End: 2021-07-19

## 2021-09-09 ENCOUNTER — APPOINTMENT (OUTPATIENT)
Dept: INTERNAL MEDICINE | Facility: CLINIC | Age: 71
End: 2021-09-09
Payer: MEDICARE

## 2021-09-09 VITALS
OXYGEN SATURATION: 97 % | HEIGHT: 61 IN | SYSTOLIC BLOOD PRESSURE: 137 MMHG | RESPIRATION RATE: 12 BRPM | HEART RATE: 65 BPM | DIASTOLIC BLOOD PRESSURE: 63 MMHG | BODY MASS INDEX: 26.06 KG/M2 | WEIGHT: 138 LBS

## 2021-09-09 PROCEDURE — 99213 OFFICE O/P EST LOW 20 MIN: CPT

## 2021-09-09 NOTE — HISTORY OF PRESENT ILLNESS
[FreeTextEntry1] : follow up of pneumonia  [de-identified] : PATIENT RETURNS FOR F/U PNEUMONIA IN JULY , FEELS WELL , NO COUGH , SPUTUM , ETC \par RECENT CXR REVEALS COMPLETE RESOLUTION OF RLL PNEUMONIA .

## 2021-09-10 DIAGNOSIS — K64.4 RESIDUAL HEMORRHOIDAL SKIN TAGS: ICD-10-CM

## 2021-09-10 RX ORDER — HYDROCORTISONE 25 MG/G
2.5 CREAM TOPICAL DAILY
Qty: 1 | Refills: 0 | Status: ACTIVE | COMMUNITY
Start: 2021-09-10 | End: 1900-01-01

## 2021-10-07 ENCOUNTER — LABORATORY RESULT (OUTPATIENT)
Age: 71
End: 2021-10-07

## 2021-10-07 ENCOUNTER — APPOINTMENT (OUTPATIENT)
Dept: INTERNAL MEDICINE | Facility: CLINIC | Age: 71
End: 2021-10-07
Payer: MEDICARE

## 2021-10-07 ENCOUNTER — NON-APPOINTMENT (OUTPATIENT)
Age: 71
End: 2021-10-07

## 2021-10-07 VITALS
BODY MASS INDEX: 25.68 KG/M2 | SYSTOLIC BLOOD PRESSURE: 141 MMHG | WEIGHT: 136 LBS | OXYGEN SATURATION: 98 % | HEIGHT: 61 IN | DIASTOLIC BLOOD PRESSURE: 69 MMHG | HEART RATE: 71 BPM

## 2021-10-07 VITALS — DIASTOLIC BLOOD PRESSURE: 80 MMHG | SYSTOLIC BLOOD PRESSURE: 128 MMHG

## 2021-10-07 PROCEDURE — G0008: CPT | Mod: 59

## 2021-10-07 PROCEDURE — G0444 DEPRESSION SCREEN ANNUAL: CPT | Mod: 59

## 2021-10-07 PROCEDURE — G0439: CPT

## 2021-10-07 PROCEDURE — 36415 COLL VENOUS BLD VENIPUNCTURE: CPT

## 2021-10-07 PROCEDURE — 90662 IIV NO PRSV INCREASED AG IM: CPT

## 2021-10-07 PROCEDURE — 93000 ELECTROCARDIOGRAM COMPLETE: CPT | Mod: 59

## 2021-10-07 PROCEDURE — 99212 OFFICE O/P EST SF 10 MIN: CPT | Mod: 25

## 2021-10-07 NOTE — PHYSICAL EXAM
[No Acute Distress] : no acute distress [Normal Sclera/Conjunctiva] : normal sclera/conjunctiva [PERRL] : pupils equal round and reactive to light [EOMI] : extraocular movements intact [Normal Outer Ear/Nose] : the outer ears and nose were normal in appearance [Normal Oropharynx] : the oropharynx was normal [No Lymphadenopathy] : no lymphadenopathy [Thyroid Normal, No Nodules] : the thyroid was normal and there were no nodules present [No Carotid Bruits] : no carotid bruits [No Abdominal Bruit] : a ~M bruit was not heard ~T in the abdomen [No Edema] : there was no peripheral edema [Normal Appearance] : normal in appearance [No Masses] : no palpable masses [No Nipple Discharge] : no nipple discharge [No Axillary Lymphadenopathy] : no axillary lymphadenopathy [Normal] : no posterior cervical lymphadenopathy and no anterior cervical lymphadenopathy [No CVA Tenderness] : no CVA  tenderness [No Skin Lesions] : no skin lesions [No Focal Deficits] : no focal deficits [Normal Insight/Judgement] : insight and judgment were intact

## 2021-10-08 LAB
25(OH)D3 SERPL-MCNC: 31.1 NG/ML
ALBUMIN SERPL ELPH-MCNC: 4.2 G/DL
ALP BLD-CCNC: 46 U/L
ALT SERPL-CCNC: 20 U/L
ANION GAP SERPL CALC-SCNC: 12 MMOL/L
AST SERPL-CCNC: 26 U/L
BASOPHILS # BLD AUTO: 0.09 K/UL
BASOPHILS NFR BLD AUTO: 1.7 %
BILIRUB SERPL-MCNC: 0.7 MG/DL
BUN SERPL-MCNC: 13 MG/DL
CALCIUM SERPL-MCNC: 9.1 MG/DL
CHLORIDE SERPL-SCNC: 106 MMOL/L
CHOLEST SERPL-MCNC: 210 MG/DL
CO2 SERPL-SCNC: 22 MMOL/L
CREAT SERPL-MCNC: 0.62 MG/DL
EOSINOPHIL # BLD AUTO: 0.52 K/UL
EOSINOPHIL NFR BLD AUTO: 9.9 %
GLUCOSE SERPL-MCNC: 103 MG/DL
HCT VFR BLD CALC: 39 %
HDLC SERPL-MCNC: 67 MG/DL
HGB BLD-MCNC: 12.6 G/DL
IMM GRANULOCYTES NFR BLD AUTO: 0.2 %
LDLC SERPL CALC-MCNC: 133 MG/DL
LYMPHOCYTES # BLD AUTO: 1.43 K/UL
LYMPHOCYTES NFR BLD AUTO: 27.2 %
MAN DIFF?: NORMAL
MCHC RBC-ENTMCNC: 32.3 GM/DL
MCHC RBC-ENTMCNC: 32.6 PG
MCV RBC AUTO: 100.8 FL
MONOCYTES # BLD AUTO: 0.41 K/UL
MONOCYTES NFR BLD AUTO: 7.8 %
NEUTROPHILS # BLD AUTO: 2.8 K/UL
NEUTROPHILS NFR BLD AUTO: 53.2 %
NONHDLC SERPL-MCNC: 143 MG/DL
PLATELET # BLD AUTO: 225 K/UL
POTASSIUM SERPL-SCNC: 4.2 MMOL/L
PROT SERPL-MCNC: 6.6 G/DL
RBC # BLD: 3.87 M/UL
RBC # FLD: 11.3 %
SODIUM SERPL-SCNC: 140 MMOL/L
TRIGL SERPL-MCNC: 53 MG/DL
TSH SERPL-ACNC: 2.37 UIU/ML
WBC # FLD AUTO: 5.26 K/UL

## 2021-10-11 LAB
APPEARANCE: ABNORMAL
BACTERIA: ABNORMAL
BILIRUBIN URINE: NEGATIVE
BLOOD URINE: NEGATIVE
COLOR: NORMAL
GLUCOSE QUALITATIVE U: NEGATIVE
HYALINE CASTS: 1 /LPF
KETONES URINE: NEGATIVE
LEUKOCYTE ESTERASE URINE: NEGATIVE
MICROSCOPIC-UA: NORMAL
NITRITE URINE: NEGATIVE
PH URINE: 6
PROTEIN URINE: NEGATIVE
RED BLOOD CELLS URINE: 4 /HPF
SPECIFIC GRAVITY URINE: 1.01
SQUAMOUS EPITHELIAL CELLS: 1 /HPF
UROBILINOGEN URINE: NORMAL
WHITE BLOOD CELLS URINE: 3 /HPF

## 2021-10-26 NOTE — ASSESSMENT
[FreeTextEntry1] : ADVISED REGULAR AEROBIC EXERCISE \par MEDITERRANEAN DIET \par CHECK LIPID PROFILE \par ADVISED SHINGLES VACCINE

## 2021-10-26 NOTE — HEALTH RISK ASSESSMENT
[0] : 2) Feeling down, depressed, or hopeless: Not at all (0) [PHQ-2 Negative - No further assessment needed] : PHQ-2 Negative - No further assessment needed [Patient reported mammogram was normal] : Patient reported mammogram was normal [Patient reported PAP Smear was normal] : Patient reported PAP Smear was normal [Patient reported bone density results were normal] : Patient reported bone density results were normal [[id number]] : 0 [MammogramDate] : 11/2020 [PapSmearDate] : 2020 [BoneDensityDate] : 2020 [ColonoscopyComments] : 4 YEARS AGO : DUE NEXT YEAR

## 2022-11-02 ENCOUNTER — NON-APPOINTMENT (OUTPATIENT)
Age: 72
End: 2022-11-02

## 2022-11-02 ENCOUNTER — APPOINTMENT (OUTPATIENT)
Dept: INTERNAL MEDICINE | Facility: CLINIC | Age: 72
End: 2022-11-02

## 2022-11-02 VITALS
BODY MASS INDEX: 27.09 KG/M2 | DIASTOLIC BLOOD PRESSURE: 86 MMHG | SYSTOLIC BLOOD PRESSURE: 159 MMHG | HEART RATE: 69 BPM | OXYGEN SATURATION: 98 % | WEIGHT: 138 LBS | HEIGHT: 60 IN

## 2022-11-02 VITALS — SYSTOLIC BLOOD PRESSURE: 140 MMHG | DIASTOLIC BLOOD PRESSURE: 100 MMHG

## 2022-11-02 DIAGNOSIS — M81.0 AGE-RELATED OSTEOPOROSIS W/OUT CURRENT PATHOLOGICAL FRACTURE: ICD-10-CM

## 2022-11-02 PROCEDURE — 36415 COLL VENOUS BLD VENIPUNCTURE: CPT

## 2022-11-02 PROCEDURE — G0439: CPT

## 2022-11-02 PROCEDURE — 93000 ELECTROCARDIOGRAM COMPLETE: CPT | Mod: 59

## 2022-11-02 PROCEDURE — G0444 DEPRESSION SCREEN ANNUAL: CPT | Mod: 59

## 2022-11-02 PROCEDURE — 90662 IIV NO PRSV INCREASED AG IM: CPT

## 2022-11-02 PROCEDURE — G0008: CPT

## 2022-11-02 PROCEDURE — 99212 OFFICE O/P EST SF 10 MIN: CPT | Mod: 25

## 2022-11-02 NOTE — HEALTH RISK ASSESSMENT
[0] : 2) Feeling down, depressed, or hopeless: Not at all (0) [PHQ-2 Negative - No further assessment needed] : PHQ-2 Negative - No further assessment needed [Patient reported mammogram was normal] : Patient reported mammogram was normal [Patient reported PAP Smear was normal] : Patient reported PAP Smear was normal [Patient reported bone density results were normal] : Patient reported bone density results were normal [Patient reported colonoscopy was abnormal] : Patient reported colonoscopy was abnormal [[id number]] : 0 [MammogramDate] : 11/2021 [PapSmearDate] : 12/2021 [BoneDensityDate] : 11/2020 [BoneDensityComments] : Osteopenia [ColonoscopyDate] : 12/2018 [ColonoscopyComments] : Positive polyp, due in 2023

## 2022-11-02 NOTE — HISTORY OF PRESENT ILLNESS
[de-identified] : Patient returns to office for annual wellness exam\par She denies any chest pain, shortness of breath or palpitation

## 2022-11-02 NOTE — PHYSICAL EXAM
[No Acute Distress] : no acute distress [Normal Sclera/Conjunctiva] : normal sclera/conjunctiva [PERRL] : pupils equal round and reactive to light [EOMI] : extraocular movements intact [Normal Oropharynx] : the oropharynx was normal [Normal TMs] : both tympanic membranes were normal [No Lymphadenopathy] : no lymphadenopathy [Thyroid Normal, No Nodules] : the thyroid was normal and there were no nodules present [No Carotid Bruits] : no carotid bruits [No Abdominal Bruit] : a ~M bruit was not heard ~T in the abdomen [No Varicosities] : no varicosities [No Edema] : there was no peripheral edema [No Palpable Aorta] : no palpable aorta [Normal Appearance] : normal in appearance [No Masses] : no palpable masses [No Nipple Discharge] : no nipple discharge [No Axillary Lymphadenopathy] : no axillary lymphadenopathy [Normal] : no posterior cervical lymphadenopathy and no anterior cervical lymphadenopathy [No CVA Tenderness] : no CVA  tenderness [No Spinal Tenderness] : no spinal tenderness [Coordination Grossly Intact] : coordination grossly intact [No Focal Deficits] : no focal deficits [Normal Affect] : the affect was normal [Normal Insight/Judgement] : insight and judgment were intact [de-identified] : No suspicious skin lesion

## 2022-11-02 NOTE — ASSESSMENT
[FreeTextEntry1] : Stable\par Advised regular aerobic exercise and heart healthy diet\par Given referral for follow-up mammography\par Patient refused follow-up bone mineral density

## 2022-11-03 ENCOUNTER — LABORATORY RESULT (OUTPATIENT)
Age: 72
End: 2022-11-03

## 2022-11-04 LAB
ALBUMIN SERPL ELPH-MCNC: 4.6 G/DL
ALP BLD-CCNC: 55 U/L
ALT SERPL-CCNC: 19 U/L
ANION GAP SERPL CALC-SCNC: 11 MMOL/L
APPEARANCE: CLEAR
AST SERPL-CCNC: 27 U/L
BACTERIA: NEGATIVE
BASOPHILS # BLD AUTO: 0.09 K/UL
BASOPHILS NFR BLD AUTO: 1.8 %
BILIRUB SERPL-MCNC: 0.8 MG/DL
BILIRUBIN URINE: NEGATIVE
BLOOD URINE: NEGATIVE
BUN SERPL-MCNC: 11 MG/DL
CALCIUM SERPL-MCNC: 9.4 MG/DL
CHLORIDE SERPL-SCNC: 102 MMOL/L
CHOLEST SERPL-MCNC: 217 MG/DL
CO2 SERPL-SCNC: 27 MMOL/L
COLOR: COLORLESS
CREAT SERPL-MCNC: 0.69 MG/DL
EGFR: 93 ML/MIN/1.73M2
EOSINOPHIL # BLD AUTO: 0.3 K/UL
EOSINOPHIL NFR BLD AUTO: 6 %
ESTIMATED AVERAGE GLUCOSE: 85 MG/DL
GLUCOSE QUALITATIVE U: NEGATIVE
GLUCOSE SERPL-MCNC: 102 MG/DL
HBA1C MFR BLD HPLC: 4.6 %
HCT VFR BLD CALC: 40.5 %
HDLC SERPL-MCNC: 72 MG/DL
HGB BLD-MCNC: 13 G/DL
HYALINE CASTS: 0 /LPF
IMM GRANULOCYTES NFR BLD AUTO: 0.2 %
KETONES URINE: NEGATIVE
LDLC SERPL CALC-MCNC: 135 MG/DL
LEUKOCYTE ESTERASE URINE: NEGATIVE
LYMPHOCYTES # BLD AUTO: 1.33 K/UL
LYMPHOCYTES NFR BLD AUTO: 26.4 %
MAN DIFF?: NORMAL
MCHC RBC-ENTMCNC: 32.1 GM/DL
MCHC RBC-ENTMCNC: 32.3 PG
MCV RBC AUTO: 100.5 FL
MICROSCOPIC-UA: NORMAL
MONOCYTES # BLD AUTO: 0.42 K/UL
MONOCYTES NFR BLD AUTO: 8.3 %
NEUTROPHILS # BLD AUTO: 2.88 K/UL
NEUTROPHILS NFR BLD AUTO: 57.3 %
NITRITE URINE: NEGATIVE
NONHDLC SERPL-MCNC: 145 MG/DL
PH URINE: 7
PLATELET # BLD AUTO: 214 K/UL
POTASSIUM SERPL-SCNC: 3.8 MMOL/L
PROT SERPL-MCNC: 6.8 G/DL
PROTEIN URINE: NEGATIVE
RBC # BLD: 4.03 M/UL
RBC # FLD: 11.3 %
RED BLOOD CELLS URINE: 0 /HPF
SODIUM SERPL-SCNC: 140 MMOL/L
SPECIFIC GRAVITY URINE: 1.01
SQUAMOUS EPITHELIAL CELLS: 0 /HPF
TRIGL SERPL-MCNC: 48 MG/DL
UROBILINOGEN URINE: NORMAL
WBC # FLD AUTO: 5.03 K/UL
WHITE BLOOD CELLS URINE: 0 /HPF

## 2023-07-11 NOTE — ED ADULT TRIAGE NOTE - SPO2 (%)
Quality 226: Preventive Care And Screening: Tobacco Use: Screening And Cessation Intervention: Patient screened for tobacco use and is an ex/non-smoker Detail Level: Detailed 97 Quality 431: Preventive Care And Screening: Unhealthy Alcohol Use - Screening: Patient not identified as an unhealthy alcohol user when screened for unhealthy alcohol use using a systematic screening method

## 2023-10-10 ENCOUNTER — APPOINTMENT (OUTPATIENT)
Dept: OBGYN | Facility: CLINIC | Age: 73
End: 2023-10-10

## 2023-10-10 VITALS
SYSTOLIC BLOOD PRESSURE: 139 MMHG | WEIGHT: 140 LBS | BODY MASS INDEX: 27.34 KG/M2 | DIASTOLIC BLOOD PRESSURE: 88 MMHG | HEART RATE: 75 BPM

## 2023-12-08 ENCOUNTER — APPOINTMENT (OUTPATIENT)
Dept: OBGYN | Facility: CLINIC | Age: 73
End: 2023-12-08
Payer: MEDICARE

## 2023-12-08 VITALS
WEIGHT: 141 LBS | SYSTOLIC BLOOD PRESSURE: 135 MMHG | DIASTOLIC BLOOD PRESSURE: 85 MMHG | HEART RATE: 75 BPM | BODY MASS INDEX: 27.54 KG/M2

## 2023-12-08 DIAGNOSIS — Z12.31 ENCOUNTER FOR SCREENING MAMMOGRAM FOR MALIGNANT NEOPLASM OF BREAST: ICD-10-CM

## 2023-12-08 DIAGNOSIS — U07.1 COVID-19: ICD-10-CM

## 2023-12-08 DIAGNOSIS — Z92.29 PERSONAL HISTORY OF OTHER DRUG THERAPY: ICD-10-CM

## 2023-12-08 DIAGNOSIS — Z12.39 ENCOUNTER FOR OTHER SCREENING FOR MALIGNANT NEOPLASM OF BREAST: ICD-10-CM

## 2023-12-08 DIAGNOSIS — Z87.898 PERSONAL HISTORY OF OTHER SPECIFIED CONDITIONS: ICD-10-CM

## 2023-12-08 DIAGNOSIS — Z87.01 PERSONAL HISTORY OF PNEUMONIA (RECURRENT): ICD-10-CM

## 2023-12-08 DIAGNOSIS — R92.2 INCONCLUSIVE MAMMOGRAM: ICD-10-CM

## 2023-12-08 DIAGNOSIS — R92.30 INCONCLUSIVE MAMMOGRAM: ICD-10-CM

## 2023-12-08 PROCEDURE — G0101: CPT

## 2023-12-11 ENCOUNTER — NON-APPOINTMENT (OUTPATIENT)
Age: 73
End: 2023-12-11

## 2023-12-13 ENCOUNTER — APPOINTMENT (OUTPATIENT)
Dept: INTERNAL MEDICINE | Facility: CLINIC | Age: 73
End: 2023-12-13
Payer: MEDICARE

## 2023-12-13 ENCOUNTER — NON-APPOINTMENT (OUTPATIENT)
Age: 73
End: 2023-12-13

## 2023-12-13 VITALS
HEIGHT: 60 IN | DIASTOLIC BLOOD PRESSURE: 68 MMHG | WEIGHT: 138 LBS | BODY MASS INDEX: 27.09 KG/M2 | HEART RATE: 94 BPM | OXYGEN SATURATION: 99 % | SYSTOLIC BLOOD PRESSURE: 140 MMHG | RESPIRATION RATE: 12 BRPM

## 2023-12-13 VITALS — DIASTOLIC BLOOD PRESSURE: 80 MMHG | SYSTOLIC BLOOD PRESSURE: 140 MMHG

## 2023-12-13 DIAGNOSIS — R03.0 ELEVATED BLOOD-PRESSURE READING, W/OUT DIAGNOSIS OF HYPERTENSION: ICD-10-CM

## 2023-12-13 DIAGNOSIS — Z00.00 ENCOUNTER FOR GENERAL ADULT MEDICAL EXAMINATION W/OUT ABNORMAL FINDINGS: ICD-10-CM

## 2023-12-13 DIAGNOSIS — E78.5 HYPERLIPIDEMIA, UNSPECIFIED: ICD-10-CM

## 2023-12-13 DIAGNOSIS — Z23 ENCOUNTER FOR IMMUNIZATION: ICD-10-CM

## 2023-12-13 PROCEDURE — 99213 OFFICE O/P EST LOW 20 MIN: CPT | Mod: 25

## 2023-12-13 PROCEDURE — 36415 COLL VENOUS BLD VENIPUNCTURE: CPT

## 2023-12-13 PROCEDURE — G0008: CPT

## 2023-12-13 PROCEDURE — 93000 ELECTROCARDIOGRAM COMPLETE: CPT | Mod: 59

## 2023-12-13 PROCEDURE — G0439: CPT

## 2023-12-13 PROCEDURE — 90662 IIV NO PRSV INCREASED AG IM: CPT

## 2023-12-13 PROCEDURE — G0444 DEPRESSION SCREEN ANNUAL: CPT | Mod: 59

## 2023-12-13 NOTE — PHYSICAL EXAM
[Normal Sclera/Conjunctiva] : normal sclera/conjunctiva [PERRL] : pupils equal round and reactive to light [EOMI] : extraocular movements intact [Normal Outer Ear/Nose] : the outer ears and nose were normal in appearance [Normal Oropharynx] : the oropharynx was normal [Normal TMs] : both tympanic membranes were normal [No Lymphadenopathy] : no lymphadenopathy [Thyroid Normal, No Nodules] : the thyroid was normal and there were no nodules present [No Carotid Bruits] : no carotid bruits [No Edema] : there was no peripheral edema [Declined Breast Exam] : declined breast exam  [Normal] : soft, non-tender, non-distended, no masses palpated, no HSM and normal bowel sounds [Normal Supraclavicular Nodes] : no supraclavicular lymphadenopathy [Normal Axillary Nodes] : no axillary lymphadenopathy [Normal Posterior Cervical Nodes] : no posterior cervical lymphadenopathy [Normal Anterior Cervical Nodes] : no anterior cervical lymphadenopathy [Normal Inguinal Nodes] : no inguinal lymphadenopathy [Normal Femoral Nodes] : no femoral lymphadenopathy [No CVA Tenderness] : no CVA  tenderness [No Spinal Tenderness] : no spinal tenderness [No Joint Swelling] : no joint swelling [No Rash] : no rash [Coordination Grossly Intact] : coordination grossly intact [No Focal Deficits] : no focal deficits [Normal Gait] : normal gait [Normal Affect] : the affect was normal [Normal Insight/Judgement] : insight and judgment were intact [de-identified] : By GYN

## 2023-12-13 NOTE — HEALTH RISK ASSESSMENT
[Little interest or pleasure doing things] : 1) Little interest or pleasure doing things [Feeling down, depressed, or hopeless] : 2) Feeling down, depressed, or hopeless [0] : 2) Feeling down, depressed, or hopeless: Not at all (0) [PHQ-2 Negative - No further assessment needed] : PHQ-2 Negative - No further assessment needed [[id number]] : 0 [Patient reported mammogram was normal] : Patient reported mammogram was normal [Patient reported PAP Smear was normal] : Patient reported PAP Smear was normal [Patient reported bone density results were abnormal] : Patient reported bone density results were abnormal [Patient reported colonoscopy was abnormal] : Patient reported colonoscopy was abnormal [MammogramDate] : 12/2023 [MammogramComments] : By GYN [PapSmearDate] : 12/2023 [PapSmearComments] : Pelvic exam by GYN [BoneDensityDate] : 11/2020 [BoneDensityComments] : Osteopenia [ColonoscopyDate] : 2018 [ColonoscopyComments] : Positive polyp, advise

## 2023-12-13 NOTE — ASSESSMENT
[FreeTextEntry1] : Stable Advise low-sodium diet, advised home blood pressure monitoring, log sheets given and advised to return to office if systolic blood pressure greater than 130 or diastolic greater than 90 Flu vaccine administered Check routine blood work

## 2023-12-13 NOTE — HISTORY OF PRESENT ILLNESS
[de-identified] : Patient presents to office for annual wellness exam.  She feels well and denies any chest pain, shortness of breath, palpitations.  She does check her blood pressure at home periodically and is usually with a systolic of 130 or less in a good diastolic.

## 2023-12-14 DIAGNOSIS — Z12.4 ENCOUNTER FOR SCREENING FOR MALIGNANT NEOPLASM OF CERVIX: ICD-10-CM

## 2023-12-14 DIAGNOSIS — Z11.51 ENCOUNTER FOR SCREENING FOR HUMAN PAPILLOMAVIRUS (HPV): ICD-10-CM

## 2023-12-14 DIAGNOSIS — Z01.419 ENCOUNTER FOR GYNECOLOGICAL EXAMINATION (GENERAL) (ROUTINE) W/OUT ABNORMAL FINDINGS: ICD-10-CM

## 2023-12-18 LAB
25(OH)D3 SERPL-MCNC: 28.7 NG/ML
ALBUMIN SERPL ELPH-MCNC: 4.5 G/DL
ALP BLD-CCNC: 48 U/L
ALT SERPL-CCNC: 21 U/L
ANION GAP SERPL CALC-SCNC: 12 MMOL/L
APPEARANCE: CLEAR
AST SERPL-CCNC: 25 U/L
BACTERIA: NEGATIVE /HPF
BASOPHILS # BLD AUTO: 0.06 K/UL
BASOPHILS NFR BLD AUTO: 1.1 %
BILIRUB SERPL-MCNC: 0.8 MG/DL
BILIRUBIN URINE: NEGATIVE
BLOOD URINE: NEGATIVE
BUN SERPL-MCNC: 20 MG/DL
CALCIUM SERPL-MCNC: 9.5 MG/DL
CAST: 0 /LPF
CHLORIDE SERPL-SCNC: 102 MMOL/L
CHOLEST SERPL-MCNC: 222 MG/DL
CO2 SERPL-SCNC: 25 MMOL/L
COLOR: YELLOW
CREAT SERPL-MCNC: 0.74 MG/DL
EGFR: 86 ML/MIN/1.73M2
EOSINOPHIL # BLD AUTO: 0.2 K/UL
EOSINOPHIL NFR BLD AUTO: 3.7 %
EPITHELIAL CELLS: 0 /HPF
FERRITIN SERPL-MCNC: 242 NG/ML
GLUCOSE QUALITATIVE U: NEGATIVE MG/DL
GLUCOSE SERPL-MCNC: 107 MG/DL
HCT VFR BLD CALC: 39.9 %
HDLC SERPL-MCNC: 73 MG/DL
HGB BLD-MCNC: 13 G/DL
IMM GRANULOCYTES NFR BLD AUTO: 0.2 %
KETONES URINE: NEGATIVE MG/DL
LDLC SERPL CALC-MCNC: 141 MG/DL
LEUKOCYTE ESTERASE URINE: NEGATIVE
LYMPHOCYTES # BLD AUTO: 1.42 K/UL
LYMPHOCYTES NFR BLD AUTO: 26.4 %
MAN DIFF?: NORMAL
MCHC RBC-ENTMCNC: 32 PG
MCHC RBC-ENTMCNC: 32.6 GM/DL
MCV RBC AUTO: 98.3 FL
MICROSCOPIC-UA: NORMAL
MONOCYTES # BLD AUTO: 0.51 K/UL
MONOCYTES NFR BLD AUTO: 9.5 %
NEUTROPHILS # BLD AUTO: 3.17 K/UL
NEUTROPHILS NFR BLD AUTO: 59.1 %
NITRITE URINE: NEGATIVE
NONHDLC SERPL-MCNC: 149 MG/DL
PH URINE: 6.5
PLATELET # BLD AUTO: 238 K/UL
POTASSIUM SERPL-SCNC: 4.5 MMOL/L
PROT SERPL-MCNC: 7 G/DL
PROTEIN URINE: NEGATIVE MG/DL
RBC # BLD: 4.06 M/UL
RBC # FLD: 11.1 %
RED BLOOD CELLS URINE: 0 /HPF
SODIUM SERPL-SCNC: 139 MMOL/L
SPECIFIC GRAVITY URINE: 1.01
TRIGL SERPL-MCNC: 52 MG/DL
UROBILINOGEN URINE: 0.2 MG/DL
WBC # FLD AUTO: 5.37 K/UL
WHITE BLOOD CELLS URINE: 0 /HPF

## 2024-06-26 ENCOUNTER — APPOINTMENT (OUTPATIENT)
Dept: INTERNAL MEDICINE | Facility: CLINIC | Age: 74
End: 2024-06-26
Payer: MEDICARE

## 2024-06-26 VITALS
BODY MASS INDEX: 26.95 KG/M2 | OXYGEN SATURATION: 96 % | HEART RATE: 86 BPM | SYSTOLIC BLOOD PRESSURE: 150 MMHG | WEIGHT: 138 LBS | TEMPERATURE: 98.9 F | DIASTOLIC BLOOD PRESSURE: 80 MMHG

## 2024-06-26 DIAGNOSIS — J18.9 PNEUMONIA, UNSPECIFIED ORGANISM: ICD-10-CM

## 2024-06-26 PROCEDURE — 99214 OFFICE O/P EST MOD 30 MIN: CPT

## 2024-06-26 RX ORDER — BENZONATATE 200 MG/1
200 CAPSULE ORAL TWICE DAILY
Qty: 20 | Refills: 1 | Status: ACTIVE | COMMUNITY
Start: 2024-06-26 | End: 1900-01-01

## 2024-07-11 ENCOUNTER — APPOINTMENT (OUTPATIENT)
Dept: INTERNAL MEDICINE | Facility: CLINIC | Age: 74
End: 2024-07-11
Payer: MEDICARE

## 2024-07-11 VITALS
TEMPERATURE: 99 F | WEIGHT: 140 LBS | BODY MASS INDEX: 27.48 KG/M2 | SYSTOLIC BLOOD PRESSURE: 120 MMHG | OXYGEN SATURATION: 96 % | HEART RATE: 69 BPM | RESPIRATION RATE: 12 BRPM | DIASTOLIC BLOOD PRESSURE: 70 MMHG | HEIGHT: 60 IN

## 2024-07-11 DIAGNOSIS — J18.9 PNEUMONIA, UNSPECIFIED ORGANISM: ICD-10-CM

## 2024-07-11 PROCEDURE — 99213 OFFICE O/P EST LOW 20 MIN: CPT

## 2024-07-23 ENCOUNTER — APPOINTMENT (OUTPATIENT)
Dept: RADIOLOGY | Facility: CLINIC | Age: 74
End: 2024-07-23
Payer: MEDICARE

## 2024-07-23 PROCEDURE — 71046 X-RAY EXAM CHEST 2 VIEWS: CPT

## 2024-12-17 ENCOUNTER — APPOINTMENT (OUTPATIENT)
Dept: INTERNAL MEDICINE | Facility: CLINIC | Age: 74
End: 2024-12-17
Payer: MEDICARE

## 2024-12-17 ENCOUNTER — LABORATORY RESULT (OUTPATIENT)
Age: 74
End: 2024-12-17

## 2024-12-17 ENCOUNTER — NON-APPOINTMENT (OUTPATIENT)
Age: 74
End: 2024-12-17

## 2024-12-17 VITALS
HEART RATE: 75 BPM | OXYGEN SATURATION: 98 % | RESPIRATION RATE: 12 BRPM | DIASTOLIC BLOOD PRESSURE: 82 MMHG | BODY MASS INDEX: 27.88 KG/M2 | WEIGHT: 142 LBS | SYSTOLIC BLOOD PRESSURE: 155 MMHG | HEIGHT: 60 IN

## 2024-12-17 VITALS — SYSTOLIC BLOOD PRESSURE: 140 MMHG | DIASTOLIC BLOOD PRESSURE: 80 MMHG

## 2024-12-17 DIAGNOSIS — Z00.00 ENCOUNTER FOR GENERAL ADULT MEDICAL EXAMINATION W/OUT ABNORMAL FINDINGS: ICD-10-CM

## 2024-12-17 DIAGNOSIS — R03.0 ELEVATED BLOOD-PRESSURE READING, W/OUT DIAGNOSIS OF HYPERTENSION: ICD-10-CM

## 2024-12-17 DIAGNOSIS — Z12.39 ENCOUNTER FOR OTHER SCREENING FOR MALIGNANT NEOPLASM OF BREAST: ICD-10-CM

## 2024-12-17 DIAGNOSIS — M81.0 AGE-RELATED OSTEOPOROSIS W/OUT CURRENT PATHOLOGICAL FRACTURE: ICD-10-CM

## 2024-12-17 DIAGNOSIS — Z23 ENCOUNTER FOR IMMUNIZATION: ICD-10-CM

## 2024-12-17 DIAGNOSIS — E78.5 HYPERLIPIDEMIA, UNSPECIFIED: ICD-10-CM

## 2024-12-17 PROCEDURE — 99213 OFFICE O/P EST LOW 20 MIN: CPT | Mod: 25

## 2024-12-17 PROCEDURE — 36415 COLL VENOUS BLD VENIPUNCTURE: CPT

## 2024-12-17 PROCEDURE — 93000 ELECTROCARDIOGRAM COMPLETE: CPT | Mod: 59

## 2024-12-17 PROCEDURE — 90662 IIV NO PRSV INCREASED AG IM: CPT

## 2024-12-17 PROCEDURE — G0439: CPT

## 2024-12-17 PROCEDURE — G0008: CPT

## 2024-12-17 PROCEDURE — G0444 DEPRESSION SCREEN ANNUAL: CPT | Mod: 59

## 2024-12-18 LAB
APPEARANCE: CLEAR
BACTERIA: NEGATIVE /HPF
BASOPHILS # BLD AUTO: 0.04 K/UL
BASOPHILS NFR BLD AUTO: 0.8 %
BILIRUBIN URINE: NEGATIVE
BLOOD URINE: NEGATIVE
CAST: 0 /LPF
COLOR: YELLOW
EOSINOPHIL # BLD AUTO: 0.28 K/UL
EOSINOPHIL NFR BLD AUTO: 5.9 %
EPITHELIAL CELLS: 5 /HPF
ESTIMATED AVERAGE GLUCOSE: 74 MG/DL
GLUCOSE QUALITATIVE U: NEGATIVE MG/DL
HBA1C MFR BLD HPLC: 4.2 %
HCT VFR BLD CALC: 40.5 %
HGB BLD-MCNC: 12.8 G/DL
IMM GRANULOCYTES NFR BLD AUTO: 0.2 %
KETONES URINE: NEGATIVE MG/DL
LEUKOCYTE ESTERASE URINE: ABNORMAL
LYMPHOCYTES # BLD AUTO: 1.2 K/UL
LYMPHOCYTES NFR BLD AUTO: 25.4 %
MAN DIFF?: NORMAL
MCHC RBC-ENTMCNC: 31.6 G/DL
MCHC RBC-ENTMCNC: 32.6 PG
MCV RBC AUTO: 103.1 FL
MICROSCOPIC-UA: NORMAL
MONOCYTES # BLD AUTO: 0.42 K/UL
MONOCYTES NFR BLD AUTO: 8.9 %
NEUTROPHILS # BLD AUTO: 2.77 K/UL
NEUTROPHILS NFR BLD AUTO: 58.8 %
NITRITE URINE: NEGATIVE
PH URINE: 7
PLATELET # BLD AUTO: 232 K/UL
PROTEIN URINE: NEGATIVE MG/DL
RBC # BLD: 3.93 M/UL
RBC # FLD: 11.4 %
RED BLOOD CELLS URINE: 0 /HPF
SPECIFIC GRAVITY URINE: 1.01
UROBILINOGEN URINE: 0.2 MG/DL
WBC # FLD AUTO: 4.72 K/UL
WHITE BLOOD CELLS URINE: 0 /HPF

## 2024-12-19 LAB
25(OH)D3 SERPL-MCNC: 32.2 NG/ML
ALBUMIN SERPL ELPH-MCNC: 4.5 G/DL
ALP BLD-CCNC: 49 U/L
ALT SERPL-CCNC: 16 U/L
ANION GAP SERPL CALC-SCNC: 14 MMOL/L
AST SERPL-CCNC: 22 U/L
BILIRUB SERPL-MCNC: 0.9 MG/DL
BUN SERPL-MCNC: 16 MG/DL
CALCIUM SERPL-MCNC: 9.1 MG/DL
CHLORIDE SERPL-SCNC: 104 MMOL/L
CHOLEST SERPL-MCNC: 242 MG/DL
CO2 SERPL-SCNC: 24 MMOL/L
CREAT SERPL-MCNC: 0.76 MG/DL
EGFR: 83 ML/MIN/1.73M2
GLUCOSE SERPL-MCNC: 105 MG/DL
HDLC SERPL-MCNC: 72 MG/DL
LDLC SERPL CALC-MCNC: 157 MG/DL
NONHDLC SERPL-MCNC: 170 MG/DL
POTASSIUM SERPL-SCNC: 4.2 MMOL/L
PROT SERPL-MCNC: 6.9 G/DL
SODIUM SERPL-SCNC: 143 MMOL/L
TRIGL SERPL-MCNC: 81 MG/DL

## 2024-12-20 LAB — HEMOCCULT STL QL IA: NEGATIVE
